# Patient Record
Sex: MALE | Race: BLACK OR AFRICAN AMERICAN | NOT HISPANIC OR LATINO | Employment: FULL TIME | URBAN - METROPOLITAN AREA
[De-identification: names, ages, dates, MRNs, and addresses within clinical notes are randomized per-mention and may not be internally consistent; named-entity substitution may affect disease eponyms.]

---

## 2017-03-29 ENCOUNTER — OFFICE VISIT (OUTPATIENT)
Dept: FAMILY MEDICINE | Facility: CLINIC | Age: 52
End: 2017-03-29
Payer: COMMERCIAL

## 2017-03-29 VITALS
DIASTOLIC BLOOD PRESSURE: 70 MMHG | HEART RATE: 81 BPM | BODY MASS INDEX: 31.06 KG/M2 | WEIGHT: 242 LBS | OXYGEN SATURATION: 99 % | RESPIRATION RATE: 14 BRPM | SYSTOLIC BLOOD PRESSURE: 134 MMHG | TEMPERATURE: 98 F | HEIGHT: 74 IN

## 2017-03-29 DIAGNOSIS — R07.0 THROAT PAIN: ICD-10-CM

## 2017-03-29 DIAGNOSIS — R11.0 NAUSEA: ICD-10-CM

## 2017-03-29 DIAGNOSIS — R50.9 FEVER, UNSPECIFIED FEVER CAUSE: ICD-10-CM

## 2017-03-29 DIAGNOSIS — B34.9 VIRAL SYNDROME: Primary | ICD-10-CM

## 2017-03-29 LAB
BASOPHILS # BLD AUTO: 0 10E9/L (ref 0–0.2)
BASOPHILS NFR BLD AUTO: 0.3 %
DEPRECATED S PYO AG THROAT QL EIA: NORMAL
DIFFERENTIAL METHOD BLD: NORMAL
EOSINOPHIL # BLD AUTO: 0 10E9/L (ref 0–0.7)
EOSINOPHIL NFR BLD AUTO: 0.3 %
ERYTHROCYTE [DISTWIDTH] IN BLOOD BY AUTOMATED COUNT: 12.8 % (ref 10–15)
FLUAV+FLUBV AG SPEC QL: NEGATIVE
FLUAV+FLUBV AG SPEC QL: NORMAL
HCT VFR BLD AUTO: 43 % (ref 40–53)
HGB BLD-MCNC: 13.8 G/DL (ref 13.3–17.7)
LYMPHOCYTES # BLD AUTO: 2.4 10E9/L (ref 0.8–5.3)
LYMPHOCYTES NFR BLD AUTO: 37.4 %
MCH RBC QN AUTO: 28 PG (ref 26.5–33)
MCHC RBC AUTO-ENTMCNC: 32.1 G/DL (ref 31.5–36.5)
MCV RBC AUTO: 87 FL (ref 78–100)
MICRO REPORT STATUS: NORMAL
MONOCYTES # BLD AUTO: 0.7 10E9/L (ref 0–1.3)
MONOCYTES NFR BLD AUTO: 10.3 %
NEUTROPHILS # BLD AUTO: 3.3 10E9/L (ref 1.6–8.3)
NEUTROPHILS NFR BLD AUTO: 51.7 %
PLATELET # BLD AUTO: 195 10E9/L (ref 150–450)
RBC # BLD AUTO: 4.93 10E12/L (ref 4.4–5.9)
SPECIMEN SOURCE: NORMAL
SPECIMEN SOURCE: NORMAL
WBC # BLD AUTO: 6.4 10E9/L (ref 4–11)

## 2017-03-29 PROCEDURE — 99214 OFFICE O/P EST MOD 30 MIN: CPT | Performed by: PHYSICIAN ASSISTANT

## 2017-03-29 PROCEDURE — 87804 INFLUENZA ASSAY W/OPTIC: CPT | Performed by: PHYSICIAN ASSISTANT

## 2017-03-29 PROCEDURE — 85025 COMPLETE CBC W/AUTO DIFF WBC: CPT | Performed by: PHYSICIAN ASSISTANT

## 2017-03-29 PROCEDURE — 36415 COLL VENOUS BLD VENIPUNCTURE: CPT | Performed by: PHYSICIAN ASSISTANT

## 2017-03-29 PROCEDURE — 87880 STREP A ASSAY W/OPTIC: CPT | Performed by: PHYSICIAN ASSISTANT

## 2017-03-29 PROCEDURE — 87081 CULTURE SCREEN ONLY: CPT | Performed by: PHYSICIAN ASSISTANT

## 2017-03-29 RX ORDER — ONDANSETRON 8 MG/1
8 TABLET, FILM COATED ORAL EVERY 8 HOURS PRN
Qty: 9 TABLET | Refills: 0 | Status: SHIPPED | OUTPATIENT
Start: 2017-03-29 | End: 2024-05-08

## 2017-03-29 NOTE — LETTER
[facility]  [address]  [facility] [id number] [phone]                                                                                                           Dimitry Rick Sr.  [address] [place] [address]    March 31, 2017      Dear Dimitry,    Your recent lab results are below. Your throat culture did not grow strep bacteria.   Return to clinic for recheck if your symptoms are not improving as anticipated.     Results for orders placed or performed in visit on 03/29/17   CBC with platelets and differential   Result Value Ref Range    WBC 6.4 4.0 - 11.0 10e9/L    RBC Count 4.93 4.4 - 5.9 10e12/L    Hemoglobin 13.8 13.3 - 17.7 g/dL    Hematocrit 43.0 40.0 - 53.0 %    MCV 87 78 - 100 fl    MCH 28.0 26.5 - 33.0 pg    MCHC 32.1 31.5 - 36.5 g/dL    RDW 12.8 10.0 - 15.0 %    Platelet Count 195 150 - 450 10e9/L    Diff Method Automated Method     % Neutrophils 51.7 %    % Lymphocytes 37.4 %    % Monocytes 10.3 %    % Eosinophils 0.3 %    % Basophils 0.3 %    Absolute Neutrophil 3.3 1.6 - 8.3 10e9/L    Absolute Lymphocytes 2.4 0.8 - 5.3 10e9/L    Absolute Monocytes 0.7 0.0 - 1.3 10e9/L    Absolute Eosinophils 0.0 0.0 - 0.7 10e9/L    Absolute Basophils 0.0 0.0 - 0.2 10e9/L   Strep, Rapid Screen   Result Value Ref Range    Specimen Description Throat     Rapid Strep A Screen       NEGATIVE: No Group A streptococcal antigen detected by immunoassay, await   culture report.      Micro Report Status FINAL 03/29/2017    Influenza A/B antigen   Result Value Ref Range    Influenza A/B Agn Specimen Nasal     Influenza A Negative NEG    Influenza B  NEG     Negative   Test results must be correlated with clinical data. If necessary, results   should be confirmed by a molecular assay or viral culture.     Beta strep group A culture   Result Value Ref Range    Specimen Description Throat     Culture Micro No Beta Streptococcus isolated     Micro  Report Status FINAL 03/31/2017      Thank you for choosing [facility].  We appreciate the opportunity to serve you and look forward to supporting your healthcare needs in the future.    If you have any questions or concerns, please call me or my staff at [phone].      Sincerely,        Nicole Joy Siegler, PA-C

## 2017-03-29 NOTE — MR AVS SNAPSHOT
"              After Visit Summary   3/29/2017    Dimitry Rick Sr.    MRN: [id number]           Patient Information     Date Of Birth          1965        Visit Information        Provider Department      3/29/2017 4:10 PM Siegler, Nicole Joy, PA-C [facility]        Today's Diagnoses     Viral syndrome    -  1    Throat pain        Fever, unspecified fever cause        Nausea           Follow-ups after your visit        Who to contact     If you have questions or need follow up information about today's clinic visit or your schedule please contact [facility] directly at [phone].  Normal or non-critical lab and imaging results will be communicated to you by [employer]Pandahart, letter or phone within 4 business days after the clinic has received the results. If you do not hear from us within 7 days, please contact the clinic through [employer]and[employer] or phone. If you have a critical or abnormal lab result, we will notify you by phone as soon as possible.  Submit refill requests through [employer] or call your pharmacy and they will forward the refill request to us. Please allow 3 business days for your refill to be completed.          Additional Information About Your Visit        MyChart Information     FaceBuzz lets you send messages to your doctor, view your test results, renew your prescriptions, schedule appointments and more. To sign up, go to [email or web address] . Click on \"Log in\" on the left side of the screen, which will take you to the Welcome page. Then click on \"Sign up Now\" on the right side of the page.     You will be asked to enter the access code listed below, as well as some personal information. Please follow the directions to create your username and password.     Your access code is: [email or web address]  Expires: 2017  5:07 PM     Your access code will  in 90 days. If you need help or a new code, please call your [facility] or " "[phone].        Care EveryWhere ID     This is your Care EveryWhere ID. This could be used by other organizations to access your [place] medical records  [id number]        Your Vitals Were     Pulse Temperature Respirations Height Pulse Oximetry BMI (Body Mass Index)    81 98  F (36.7  C) (Tympanic) 14 6' 2.25\" (1.886 m) 99% 30.86 kg/m2       Blood Pressure from Last 3 Encounters:   03/29/17 134/70   10/10/16 120/82   08/31/15 118/74    Weight from Last 3 Encounters:   03/29/17 242 lb (109.8 kg)   10/10/16 246 lb (111.6 kg)   08/31/15 248 lb (112.5 kg)              We Performed the Following     CBC with platelets and differential     Influenza A/B antigen     Strep, Rapid Screen          Today's Medication Changes          These changes are accurate as of: 3/29/17  5:07 PM.  If you have any questions, ask your nurse or doctor.               Start taking these medicines.        Dose/Directions    ondansetron 8 MG tablet   Commonly known as:  ZOFRAN   Used for:  Nausea   Started by:  Siegler, Nicole Joy, PA-C        Dose:  8 mg   Take 1 tablet (8 mg) by mouth every 8 hours as needed for nausea   Quantity:  9 tablet   Refills:  0            Where to get your medicines      These medications were sent to [facility]/pharmacy #[id number] - [place], [place] - [address] [address]     Phone:  [phone]     ondansetron 8 MG tablet                Primary Care Provider Office Phone # Fax #    Jacob Agustín Patel [phone]       [facility] [address] [address]        Thank you!     Thank you for choosing [facility]  for your care. Our goal is always to provide you with excellent care. Hearing back from our patients is one way we can continue to improve our services. Please take a few minutes to complete the written survey that you may receive in the mail after your visit with us. Thank you!           "   Your Updated Medication List - Protect others around you: Learn how to safely use, store and throw away your medicines at www.disposemymeds.org.          This list is accurate as of: 3/29/17  5:07 PM.  Always use your most recent med list.                   Brand Name Dispense Instructions for use    * aspirin 81 MG tablet     100 tablet    Take 1 tablet (81 mg) by mouth daily       * aspirin 81 MG chewable tablet    ASPIRIN LOW DOSE    90 tablet    TAKE 1 TABLET (81 MG) BY MOUTH DAILY       blood glucose monitoring meter device kit     1 kit    Use to test blood sugars twice daily as directed.       FIBERCON 625 MG tablet   Generic drug:  calcium polycarbophil     30    1 tab q d       glipiZIDE 5 MG 24 hr tablet    GLUCOTROL XL    90 tablet    TAKE 1 TABLET (5 MG) BY MOUTH DAILY       lisinopril 40 MG tablet    PRINIVIL/ZESTRIL    90 tablet    TAKE 1 TABLET (40 MG) BY MOUTH DAILY       metFORMIN 500 MG 24 hr tablet    GLUCOPHAGE-XR    360 tablet    TAKE 4 TABLETS (2,000 MG) BY MOUTH DAILY (WITH BREAKFAST)       ondansetron 8 MG tablet    ZOFRAN    9 tablet    Take 1 tablet (8 mg) by mouth every 8 hours as needed for nausea       ONE TOUCH ULTRA test strip   Generic drug:  blood glucose monitoring     200 strip    USE TO TEST BLOOD SUGARS TWICE DAILY AS DIRECTED.       ONETOUCH DELICA LANCETS 33G Misc     200 each    USE 1 LANCET 2 TIMES DAILY TO TEST BLOOD SUGARS TWICE DAILY AS DIRECTED.       simvastatin 80 MG tablet    ZOCOR    90 tablet    TAKE 1 TABLET (80 MG) BY MOUTH AT BEDTIME       terbinafine 250 MG tablet    lamISIL    90 tablet    Take 1 tablet (250 mg) by mouth daily       * Notice:  This list has 2 medication(s) that are the same as other medications prescribed for you. Read the directions carefully, and ask your doctor or other care provider to review them with you.

## 2017-03-29 NOTE — PROGRESS NOTES
SUBJECTIVE:                                                    Dimitry Rick Sr. is a 51 year old male who presents to clinic today for the following health issues:    RESPIRATORY SYMPTOMS      Duration: X7 days    Description  sore throat, cough, fever, chills, fatigue/malaise and myalgias, loose stools    Severity: moderate    Accompanying signs and symptoms: None    History (predisposing factors):  none    Precipitating or alleviating factors: None    Therapies tried and outcome:  rest and fluids acetaminophen nyquil/alkaseltzer      As above; symptoms that began as cough, fever, sore throat with painful swallowing that are now also including loose stools and nausea without vomiting. Fever has been to 102 at home though most recent was 100.5 today, seems to be improving. He has used fever reducing medications earlier this AM. He has had minimal improvement with rest, fluids and OTC cold medications.     No known ill contacts, he has not had any recent travel. No hx of asthma or pulmonary disease. He is not a diabetic or immune compromised.    Problem list and histories reviewed & adjusted, as indicated.  Additional history: as documented    Patient Active Problem List   Diagnosis     Personal history of allergy to penicillin     Disturbance of skin sensation     Dermatophytosis of nail     Nonspecific abnormal results of liver function study     Internal hemorrhoids     ETD (eustachian tube dysfunction)     Diabetes mellitus type 2, controlled, without complications (H)     Hyperlipidemia LDL goal <100     Hypertension goal BP (blood pressure) < 140/90     CTS (carpal tunnel syndrome)     History reviewed. No pertinent surgical history.    Social History   Substance Use Topics     Smoking status: Former Smoker     Quit date: 11/10/2001     Smokeless tobacco: Never Used     Alcohol use 0.0 oz/week     0 Standard drinks or equivalent per week      Comment: 1-2 drinks/day     Family History   Problem Relation  Age of Onset     DIABETES Mother      Type 1     Arthritis Mother      ?     CEREBROVASCULAR DISEASE Father      C.A.D. Sister      triple bypass     Genetic Disorder Sister      psoriasis         Current Outpatient Prescriptions   Medication Sig Dispense Refill     ondansetron (ZOFRAN) 8 MG tablet Take 1 tablet (8 mg) by mouth every 8 hours as needed for nausea 9 tablet 0     ONE TOUCH ULTRA test strip USE TO TEST BLOOD SUGARS TWICE DAILY AS DIRECTED. 200 strip 0     ONETOUCH DELICA LANCETS 33G MISC USE 1 LANCET 2 TIMES DAILY TO TEST BLOOD SUGARS TWICE DAILY AS DIRECTED. 200 each 0     metFORMIN (GLUCOPHAGE-XR) 500 MG 24 hr tablet TAKE 4 TABLETS (2,000 MG) BY MOUTH DAILY (WITH BREAKFAST) 360 tablet 3     glipiZIDE (GLUCOTROL XL) 5 MG 24 hr tablet TAKE 1 TABLET (5 MG) BY MOUTH DAILY 90 tablet 3     lisinopril (PRINIVIL,ZESTRIL) 40 MG tablet TAKE 1 TABLET (40 MG) BY MOUTH DAILY 90 tablet 3     simvastatin (ZOCOR) 80 MG tablet TAKE 1 TABLET (80 MG) BY MOUTH AT BEDTIME 90 tablet 3     aspirin (ASPIRIN LOW DOSE) 81 MG chewable tablet TAKE 1 TABLET (81 MG) BY MOUTH DAILY 90 tablet 3     aspirin 81 MG tablet Take 1 tablet (81 mg) by mouth daily 100 tablet 11     blood glucose monitoring (ONE TOUCH ULTRA MINI) meter device kit Use to test blood sugars twice daily as directed. 1 kit 0     FIBERCON 625 MG OR TABS 1 tab q d 30 0     terbinafine (LAMISIL) 250 MG tablet Take 1 tablet (250 mg) by mouth daily (Patient not taking: Reported on 3/29/2017) 90 tablet 0       Reviewed and updated as needed this visit by clinical staff  Tobacco  Allergies  Meds  Med Hx  Surg Hx  Fam Hx  Soc Hx      Reviewed and updated as needed this visit by Provider         ROS:  CONSTITUTIONAL:POSITIVE  for anorexia, fatigue, fever, malaise and myalgias and NEGATIVE  for weight loss  INTEGUMENTARY/SKIN: NEGATIVE for worrisome rashes or lesion  EYES: NEGATIVE for redness, discharge or irritation  ENT/MOUTH: POSITIVE for sore throat as above and  "NEGATIVE for earache, hoarse voice, nasal congestion, postnasal drainage and sinus pressure  RESP:POSITIVE for cough-non productive and intermittent shortness of breath and NEGATIVE for dyspnea on exertion, sputum and wheezing  CV: NEGATIVE for chest pain, palpitations or peripheral edema  GI: POSITIVE for nausea and abdominal pain associated with loose stools (improves following stool) and NEGATIVE for constipation, gas or bloating, heartburn or reflux, hematochezia, melena and vomiting    OBJECTIVE:                                                    /70 (BP Location: Right arm, Patient Position: Chair, Cuff Size: Adult Regular)  Pulse 81  Temp 98  F (36.7  C) (Tympanic)  Resp 14  Ht 6' 2.25\" (1.886 m)  Wt 242 lb (109.8 kg)  SpO2 99%  BMI 30.86 kg/m2  Body mass index is 30.86 kg/(m^2).  GENERAL: healthy, alert and no distress  EYES: Eyes grossly normal to inspection, PERRL and conjunctivae and sclerae normal  HENT: normal cephalic/atraumatic, ear canals and TM's normal, nasal mucosa edematous , oropharynx clear, oral mucous membranes moist, tonsillar hypertrophy and tonsillar erythema  NECK: bilateral anterior cervical adenopathy, no asymmetry, masses, or scars and thyroid normal to palpation  RESP: lungs clear to auscultation - no rales, rhonchi or wheezes  CV: regular rate and rhythm, normal S1 S2, no S3 or S4, no murmur, click or rub, no peripheral edema and peripheral pulses strong  ABDOMEN: soft, nontender, no hepatosplenomegaly, no masses and bowel sounds normal  MS: no gross musculoskeletal defects noted, no edema  SKIN: no suspicious lesions or rashes  PSYCH: mentation appears normal, affect normal/bright    Diagnostic Test Results:  Results for orders placed or performed in visit on 03/29/17 (from the past 24 hour(s))   Strep, Rapid Screen   Result Value Ref Range    Specimen Description Throat     Rapid Strep A Screen       NEGATIVE: No Group A streptococcal antigen detected by immunoassay, " await   culture report.      Micro Report Status FINAL 03/29/2017    Influenza A/B antigen   Result Value Ref Range    Influenza A/B Agn Specimen Nasal     Influenza A Negative NEG    Influenza B  NEG     Negative   Test results must be correlated with clinical data. If necessary, results   should be confirmed by a molecular assay or viral culture.     CBC with platelets and differential   Result Value Ref Range    WBC 6.4 4.0 - 11.0 10e9/L    RBC Count 4.93 4.4 - 5.9 10e12/L    Hemoglobin 13.8 13.3 - 17.7 g/dL    Hematocrit 43.0 40.0 - 53.0 %    MCV 87 78 - 100 fl    MCH 28.0 26.5 - 33.0 pg    MCHC 32.1 31.5 - 36.5 g/dL    RDW 12.8 10.0 - 15.0 %    Platelet Count 195 150 - 450 10e9/L    Diff Method Automated Method     % Neutrophils 51.7 %    % Lymphocytes 37.4 %    % Monocytes 10.3 %    % Eosinophils 0.3 %    % Basophils 0.3 %    Absolute Neutrophil 3.3 1.6 - 8.3 10e9/L    Absolute Lymphocytes 2.4 0.8 - 5.3 10e9/L    Absolute Monocytes 0.7 0.0 - 1.3 10e9/L    Absolute Eosinophils 0.0 0.0 - 0.7 10e9/L    Absolute Basophils 0.0 0.0 - 0.2 10e9/L        ASSESSMENT/PLAN:                                                        ICD-10-CM    1. Viral syndrome B34.9    2. Throat pain R07.0 Strep, Rapid Screen   3. Fever, unspecified fever cause R50.9 Influenza A/B antigen     CBC with platelets and differential   4. Nausea R11.0 ondansetron (ZOFRAN) 8 MG tablet     Lab results reviewed with the patient. Negative strep and flu, blood cell counts are normal and not suggestive of inflammation or bacterial infection. He does have some clinic findings suggestive of viral syndrome and he agrees to continue to monitor and treat with conservative measures. I provided him prescription for antinausea medication and should that not be helpful or his symptoms persist he will call or return for re-evaluation. He agrees with this plan.       Nicole Joy Siegler, PA-C  [facility]

## 2017-03-29 NOTE — NURSING NOTE
"Chief Complaint   Patient presents with     URI     Cough, loose stools, sore throat, had elevated temp, body aches, vomiting       Initial /70 (BP Location: Right arm, Patient Position: Chair, Cuff Size: Adult Regular)  Pulse 81  Temp 98  F (36.7  C) (Tympanic)  Resp 14  Ht 6' 2.25\" (1.886 m)  Wt 242 lb (109.8 kg)  SpO2 99%  BMI 30.86 kg/m2 Estimated body mass index is 30.86 kg/(m^2) as calculated from the following:    Height as of this encounter: 6' 2.25\" (1.886 m).    Weight as of this encounter: 242 lb (109.8 kg).  Medication Reconciliation: complete     Luisa Bradshaw LPN  "

## 2017-03-31 LAB
BACTERIA SPEC CULT: NORMAL
MICRO REPORT STATUS: NORMAL
SPECIMEN SOURCE: NORMAL

## 2017-06-26 ENCOUNTER — TRANSFERRED RECORDS (OUTPATIENT)
Dept: HEALTH INFORMATION MANAGEMENT | Facility: CLINIC | Age: 52
End: 2017-06-26

## 2017-08-07 ENCOUNTER — TELEPHONE (OUTPATIENT)
Dept: FAMILY MEDICINE | Facility: CLINIC | Age: 52
End: 2017-08-07

## 2017-08-07 NOTE — TELEPHONE ENCOUNTER
Myra,  Per [facility] patient needs PA for Simvastatin.  Insurance only will pay for 40 mg daily. Current dose is 80 mg daily.  [facility] gave me number to call  [phone].    I called patient to inform him of this.  Thanks, Connie Meehan RN

## 2017-08-07 NOTE — TELEPHONE ENCOUNTER
Reason for Call:  Medication or medication refill:    Do you use a [facility]?  Name of the pharmacy and phone number for the current request:    [facility]/PHARMACY #[id number] - [address]    Name of the medication requested: simvastatin (ZOCOR) 80 MG tablet    Other request: pt. Is aware that he can no longer be seen at a  clinic.  He will look for a new clinic, but wondering if he can  Get at least a partial refill of the medication?  Please call to advise    Can we leave a detailed message on this number? YES    Phone number patient can be reached at: Home number on file [phone] (home) or Cell number on file:    Telephone Information:   Mobile [phone]       Best Time: any time    Call taken on 8/7/2017 at 4:48 PM by Rosemary Foster    .

## 2017-08-15 NOTE — TELEPHONE ENCOUNTER
Patient calling to speak with Myra, said it was regarding  The Simvistatin, no further information  Please call back at [phone]

## 2017-11-20 DIAGNOSIS — I10 ESSENTIAL HYPERTENSION WITH GOAL BLOOD PRESSURE LESS THAN 140/90: ICD-10-CM

## 2017-11-20 DIAGNOSIS — E78.5 HYPERLIPIDEMIA LDL GOAL <100: ICD-10-CM

## 2017-11-20 DIAGNOSIS — E11.9 CONTROLLED TYPE 2 DIABETES MELLITUS WITHOUT COMPLICATION, WITHOUT LONG-TERM CURRENT USE OF INSULIN (H): ICD-10-CM

## 2017-11-20 RX ORDER — METFORMIN HCL 500 MG
TABLET, EXTENDED RELEASE 24 HR ORAL
Qty: 120 TABLET | Refills: 0 | Status: SHIPPED | OUTPATIENT
Start: 2017-11-20

## 2017-11-20 RX ORDER — LISINOPRIL 40 MG/1
TABLET ORAL
Qty: 30 TABLET | Refills: 0 | Status: SHIPPED | OUTPATIENT
Start: 2017-11-20

## 2017-11-20 RX ORDER — ASPIRIN 81 MG
TABLET,CHEWABLE ORAL
Qty: 30 TABLET | Refills: 0 | Status: SHIPPED | OUTPATIENT
Start: 2017-11-20

## 2017-11-20 RX ORDER — GLIPIZIDE 5 MG/1
5 TABLET, FILM COATED, EXTENDED RELEASE ORAL DAILY
Qty: 30 TABLET | Refills: 0 | Status: SHIPPED | OUTPATIENT
Start: 2017-11-20

## 2017-11-20 RX ORDER — SIMVASTATIN 80 MG
TABLET ORAL
Qty: 30 TABLET | Refills: 0 | Status: SHIPPED | OUTPATIENT
Start: 2017-11-20

## 2017-11-20 NOTE — TELEPHONE ENCOUNTER
Medication is being filled for 1 time refill only due to:  Patient needs to be seen because it has been more than one year since last visit.   Due for physical, labs.  Last 10/10/16.  Connie Meehan RN    Simvastatin, Metformin, Glipizide, ASA 81 mg, Lisinopril         Last Written Prescription Date: 10/10/2016  Last Fill Quantity: 3 month supply , # refills: 3  Last Office Visit with [facility], [facility] or [facility] prescribing provider:  10/10/2016        BP Readings from Last 3 Encounters:   03/29/17 134/70   10/10/16 120/82   08/31/15 118/74     Lab Results   Component Value Date    MICROL 46 10/10/2016     Lab Results   Component Value Date    UMALCR 28.40 10/10/2016     Creatinine   Date Value Ref Range Status   10/10/2016 0.87 0.66 - 1.25 mg/dL Final   ]  GFR Estimate   Date Value Ref Range Status   10/10/2016 >90  Non  GFR Calc   >60 mL/min/1.7m2 Final   08/31/2015 85 >60 mL/min/1.7m2 Final     Comment:     Non  GFR Calc   09/08/2014 80 >60 mL/min/1.7m2 Final     Comment:     Non  GFR Calc     GFR Estimate If Black   Date Value Ref Range Status   10/10/2016 >90   GFR Calc   >60 mL/min/1.7m2 Final   08/31/2015 >90   GFR Calc   >60 mL/min/1.7m2 Final   09/08/2014 >90   GFR Calc   >60 mL/min/1.7m2 Final     Lab Results   Component Value Date    CHOL 146 10/10/2016     Lab Results   Component Value Date    HDL 48 10/10/2016     Lab Results   Component Value Date    LDL 84 10/10/2016     Lab Results   Component Value Date    TRIG 68 10/10/2016     Lab Results   Component Value Date    CHOLHDLRATIO 2.5 09/08/2014     Lab Results   Component Value Date    AST 65 10/10/2016     Lab Results   Component Value Date    ALT 36 10/10/2016     Lab Results   Component Value Date    A1C 6.3 10/10/2016    A1C 6.3 08/31/2015    A1C 6.9 09/08/2014    A1C 6.4 12/30/2013    A1C 6.7 09/09/2013     Potassium   Date Value Ref Range Status   10/10/2016  4.0 3.4 - 5.3 mmol/L Final

## 2024-04-12 ENCOUNTER — TRANSFERRED RECORDS (OUTPATIENT)
Dept: MULTI SPECIALTY CLINIC | Facility: CLINIC | Age: 59
End: 2024-04-12

## 2024-04-12 LAB — RETINOPATHY: NORMAL

## 2024-05-08 ENCOUNTER — HOSPITAL ENCOUNTER (EMERGENCY)
Facility: CLINIC | Age: 59
Discharge: HOME | End: 2024-05-09
Attending: EMERGENCY MEDICINE | Admitting: EMERGENCY MEDICINE
Payer: COMMERCIAL

## 2024-05-08 DIAGNOSIS — N30.01 ACUTE CYSTITIS WITH HEMATURIA: ICD-10-CM

## 2024-05-08 LAB
ALBUMIN UR-MCNC: 300 MG/DL
APPEARANCE UR: ABNORMAL
BILIRUB UR QL STRIP: NEGATIVE
COLOR UR AUTO: ABNORMAL
GLUCOSE UR STRIP-MCNC: NEGATIVE MG/DL
HGB UR QL STRIP: ABNORMAL
KETONES UR STRIP-MCNC: ABNORMAL MG/DL
LEUKOCYTE ESTERASE UR QL STRIP: ABNORMAL
MUCOUS THREADS #/AREA URNS LPF: PRESENT /LPF
NITRATE UR QL: NEGATIVE
PH UR STRIP: 6 [PH] (ref 5–7)
RBC URINE: >182 /HPF
SP GR UR STRIP: 1.02 (ref 1–1.03)
SQUAMOUS EPITHELIAL: 3 /HPF
UROBILINOGEN UR STRIP-MCNC: NORMAL MG/DL
WBC CLUMPS #/AREA URNS HPF: PRESENT /HPF
WBC URINE: >182 /HPF

## 2024-05-08 PROCEDURE — 81001 URINALYSIS AUTO W/SCOPE: CPT | Performed by: EMERGENCY MEDICINE

## 2024-05-08 PROCEDURE — 87086 URINE CULTURE/COLONY COUNT: CPT | Performed by: EMERGENCY MEDICINE

## 2024-05-08 PROCEDURE — 250N000013 HC RX MED GY IP 250 OP 250 PS 637: Performed by: EMERGENCY MEDICINE

## 2024-05-08 PROCEDURE — 99284 EMERGENCY DEPT VISIT MOD MDM: CPT

## 2024-05-08 PROCEDURE — 87186 SC STD MICRODIL/AGAR DIL: CPT | Performed by: EMERGENCY MEDICINE

## 2024-05-08 RX ORDER — ACETAMINOPHEN 500 MG
1000 TABLET ORAL ONCE
Status: COMPLETED | OUTPATIENT
Start: 2024-05-08 | End: 2024-05-08

## 2024-05-08 RX ADMIN — ACETAMINOPHEN 1000 MG: 500 TABLET, FILM COATED ORAL at 22:52

## 2024-05-08 ASSESSMENT — COLUMBIA-SUICIDE SEVERITY RATING SCALE - C-SSRS
1. IN THE PAST MONTH, HAVE YOU WISHED YOU WERE DEAD OR WISHED YOU COULD GO TO SLEEP AND NOT WAKE UP?: NO
6. HAVE YOU EVER DONE ANYTHING, STARTED TO DO ANYTHING, OR PREPARED TO DO ANYTHING TO END YOUR LIFE?: NO
2. HAVE YOU ACTUALLY HAD ANY THOUGHTS OF KILLING YOURSELF IN THE PAST MONTH?: NO

## 2024-05-09 ENCOUNTER — TELEPHONE (OUTPATIENT)
Dept: NURSING | Facility: CLINIC | Age: 59
End: 2024-05-09
Payer: COMMERCIAL

## 2024-05-09 VITALS
DIASTOLIC BLOOD PRESSURE: 76 MMHG | SYSTOLIC BLOOD PRESSURE: 138 MMHG | HEIGHT: 74 IN | BODY MASS INDEX: 30.61 KG/M2 | WEIGHT: 238.54 LBS | TEMPERATURE: 99.4 F | HEART RATE: 98 BPM | OXYGEN SATURATION: 98 % | RESPIRATION RATE: 16 BRPM

## 2024-05-09 PROCEDURE — 250N000013 HC RX MED GY IP 250 OP 250 PS 637: Performed by: EMERGENCY MEDICINE

## 2024-05-09 RX ORDER — CEFDINIR 300 MG/1
300 CAPSULE ORAL ONCE
Status: COMPLETED | OUTPATIENT
Start: 2024-05-09 | End: 2024-05-09

## 2024-05-09 RX ORDER — CEFPODOXIME PROXETIL 200 MG/1
200 TABLET, FILM COATED ORAL 2 TIMES DAILY
Qty: 20 TABLET | Refills: 0 | Status: SHIPPED | OUTPATIENT
Start: 2024-05-09 | End: 2024-05-19

## 2024-05-09 RX ORDER — PHENAZOPYRIDINE HYDROCHLORIDE 200 MG/1
200 TABLET, FILM COATED ORAL 3 TIMES DAILY
Qty: 9 TABLET | Refills: 0 | Status: SHIPPED | OUTPATIENT
Start: 2024-05-09 | End: 2024-05-12

## 2024-05-09 RX ORDER — PHENAZOPYRIDINE HYDROCHLORIDE 100 MG/1
200 TABLET, FILM COATED ORAL ONCE
Status: COMPLETED | OUTPATIENT
Start: 2024-05-09 | End: 2024-05-09

## 2024-05-09 RX ADMIN — CEFDINIR 300 MG: 300 CAPSULE ORAL at 00:35

## 2024-05-09 RX ADMIN — PHENAZOPYRIDINE 200 MG: 100 TABLET ORAL at 00:35

## 2024-05-09 NOTE — TELEPHONE ENCOUNTER
Patient calling requesting to reach Dr Ulrich's office to leave a message.  Misdirected call.    Per [facility] Dr Ulrich is through [facility]. Reviewed correct phone number with patient and transferred.    Patient stating he has questions on medication prescribed by Dr Ulrich and is requesting a message.    Roula Marks RN  [facility]

## 2024-05-09 NOTE — ED TRIAGE NOTES
"Hematuria started today but \"it looked a little pink the other day\". Pt now feels like he is unable to empty his bladder.         "

## 2024-05-09 NOTE — ED PROVIDER NOTES
"    History     Chief Complaint:  Hematuria       HPI   Dimitry Rick Sr. is a 59 year old male with a history of CAD, HYUN, NSTEMI, hypertension, hyperlipidemia, rheumatoid arthritis, and type 2 diabetes who presents to the ED for evaluation of hematuria. The patient states the hematuria started today along with dysuria, lower abdominal pain, and increased frequency and urgency with urination. States when he does urinate, it does not feel like his bladder completely empties. States he has never had these symptoms before. Denies changes in bowel movements, fever, vomiting, and penile discharge. Denies history of urinary tract infections, prostates issues, kidney stones, and recent STIs.     Independent Historian:    None    Medications:    Aspirin 81 mg  Fibercon  Glipizide  Lisinopril  Metformin  Simvastatin   Farxiga  Jardiance  Evolocumab  Metoprolol tartrate     Past Medical History:    HTN  HLD  RA  Diabetes, type 2  ANA MARIA  NSTEMI  CAD  HYUN  Hepatic steatosis  Carpal tunnel    Physical Exam   Patient Vitals for the past 24 hrs:   BP Temp Temp src Pulse Resp SpO2 Height Weight   05/09/24 0039 138/76 -- -- 98 16 98 % -- --   05/08/24 2250 (!) 140/89 99.4  F (37.4  C) Temporal 106 16 98 % 1.88 m (6' 2\") 108.2 kg (238 lb 8.6 oz)        Physical Exam  General: Patient is awake, alert and interactive when I enter the room.  Head: The scalp, face, and head appear normal  Eyes: Conjunctivae and sclerae are normal  Neck: Normal range of motion.   CV: Regular rate.   Resp:  No respiratory distress.   GI: suprapubic tenderness but abdomen is soft, no rigidity. No evidence of pulsatile mass. No fluid waves or evidence of ascites. No distension. No hernias or bruising are noted in detailed exam. No CVA tenderness.    MS: Normal tone.   Skin: Normal capillary refill noted  Neuro: Speech is normal and fluent. Face is symmetric. Moving all extremities.   Psych:  Normal affect.  Appropriate interactions.         Emergency " Department Course     Laboratory:  Labs Ordered and Resulted from Time of ED Arrival to Time of ED Departure   ROUTINE UA WITH MICROSCOPIC REFLEX TO CULTURE - Abnormal       Result Value    Color Urine Orange (*)     Appearance Urine Cloudy (*)     Glucose Urine Negative      Bilirubin Urine Negative      Ketones Urine Trace (*)     Specific Gravity Urine 1.021      Blood Urine Large (*)     pH Urine 6.0      Protein Albumin Urine 300 (*)     Urobilinogen Urine Normal      Nitrite Urine Negative      Leukocyte Esterase Urine Large (*)     WBC Clumps Urine Present (*)     Mucus Urine Present (*)     RBC Urine >182 (*)     WBC Urine >182 (*)     Squamous Epithelials Urine 3 (*)    URINE CULTURE      Emergency Department Course & Assessments:    Interventions:  Medications   acetaminophen (TYLENOL) tablet 1,000 mg (1,000 mg Oral $Given 5/8/24 2252)   cefdinir (OMNICEF) capsule 300 mg (300 mg Oral $Given 5/9/24 0035)   phenazopyridine (PYRIDIUM) tablet 200 mg (200 mg Oral $Given 5/9/24 0035)        Assessments:  0010 I obtained history and performed a physical exam as noted above.     Independent Interpretation (X-rays, CTs, rhythm strip):  None    Consultations/Discussion of Management or Tests:  None       Social Determinants of Health affecting care:  None     Disposition:  The patient was discharged.    Impression & Plan         Medical Decision Making:  This a 59-year-old gentleman who presents to the emergency department with hematuria, dysuria, suprapubic pain, increased urgency and frequency of urination.  Overall the patient's constellation of symptoms and his urinalysis is consistent with a urinary tract infection.  He has no significant flank pain, fevers, vomiting or other systemic symptoms to raise my suspicion for pyelonephritis or more systemic infectious at this time.  Will cover with cefepime as this is deemed a complicated UTI given that this is a male.  Low suspicion for prostatitis or STI.  Will him  follow-up with urology as needed and return the emergency room with any new or worsening symptoms.    Diagnosis:    ICD-10-CM    1. Acute cystitis with hematuria  N30.01            Discharge Medications:  Discharge Medication List as of 5/9/2024 12:33 AM        START taking these medications    Details   cefpodoxime (VANTIN) 200 MG tablet Take 1 tablet (200 mg) by mouth 2 times daily for 10 days, Disp-20 tablet, R-0, Local Print      phenazopyridine (PYRIDIUM) 200 MG tablet Take 1 tablet (200 mg) by mouth 3 times daily for 3 days, Disp-9 tablet, R-0, Local Print              Scribe Disclosure:  SAMUEL, Génesis Yun, am serving as a scribe at 12:17 AM on 5/9/2024 to document services personally performed by Herman Sarmiento MD based on my observations and the provider's statements to me.    5/8/2024   Herman Sarmiento MD Battista, Christopher Joseph, MD  05/09/24 0057

## 2024-05-10 ENCOUNTER — TELEPHONE (OUTPATIENT)
Dept: EMERGENCY MEDICINE | Facility: CLINIC | Age: 59
End: 2024-05-10
Payer: COMMERCIAL

## 2024-05-10 LAB — BACTERIA UR CULT: ABNORMAL

## 2024-05-10 NOTE — TELEPHONE ENCOUNTER
"[facility]    Reason for call: Lab Result Notification     Lab Result (including Rx patient on, if applicable).  If culture, copy of lab report at bottom.  Lab Result: See below  Cefpodoxime (Vantin) 200 MG tablet, 1 tablet (200 mg) by mouth 2 times daily for 10 days SUSCEPTIBLE  Creatinine Level (mg/dl)   Creatinine   Date Value Ref Range Status   07/10/2020 2.20 (H) 0.70 - 1.30 mg/dL Final   10/10/2016 0.87 0.66 - 1.25 mg/dL Final    Creatinine clearance (ml/min), if applicable    Creatinine clearance cannot be calculated (Patient's most recent lab result is older than the maximum 365 days allowed.)     Patient's current Symptoms:   Patient reports he is \"doing good\". Feels the medication is starting to take effect. No more pain.     RN Recommendations/Instructions per [place] ED lab result protocol:   [facility] ED lab result protocol utilized: Urine culture  Reviewed completely full course of ABX and return precautions. All questions answered.   Patient/care giver notified to contact your PCP clinic or return to the Emergency department if your:  Symptoms return.  Symptoms do not improve after 3 days on antibiotic.  Symptoms do not resolve after completing antibiotic.  Symptoms worsen or other concerning symptoms.    Joseph Joe RN   "

## 2025-02-05 ENCOUNTER — OFFICE VISIT (OUTPATIENT)
Dept: CARDIOLOGY | Facility: CLINIC | Age: 60
End: 2025-02-05
Payer: COMMERCIAL

## 2025-02-05 VITALS
SYSTOLIC BLOOD PRESSURE: 166 MMHG | BODY MASS INDEX: 31.75 KG/M2 | WEIGHT: 247.4 LBS | HEIGHT: 74 IN | OXYGEN SATURATION: 97 % | DIASTOLIC BLOOD PRESSURE: 88 MMHG | HEART RATE: 60 BPM

## 2025-02-05 DIAGNOSIS — Z13.6 SCREENING FOR CARDIOVASCULAR CONDITION: Primary | ICD-10-CM

## 2025-02-05 DIAGNOSIS — I25.10 CORONARY ARTERY DISEASE INVOLVING NATIVE CORONARY ARTERY OF NATIVE HEART WITHOUT ANGINA PECTORIS: ICD-10-CM

## 2025-02-05 RX ORDER — GABAPENTIN 300 MG/1
300 CAPSULE ORAL AT BEDTIME
COMMUNITY
Start: 2024-09-11

## 2025-02-05 NOTE — LETTER
2/5/2025    Provider Not In System       RE: Dimitry Rick Sr.       Dear Colleague,     I had the pleasure of seeing Dimitry Rick Sr. in the [facility].  CARDIOLOGY CLINIC CONSULTATION    PRIMARY CARE PHYSICIAN:  Provider Not In System    HISTORY OF PRESENT ILLNESS:  The patient is a very pleasant 59-year-old gentleman with known coronary disease, hypertension, hyperlipidemia, diabetes and nicotine dependence.    He suffered non-ST elevation MI in May 2021.  He underwent coronary angiography at an outside hospital which demonstrated high-grade stenosis involving the mid LAD.  He had a successful PCI and stent was placed in the LAD.  Per report, echocardiogram demonstrated normal LV function.    Since his acute coronary event in 2021, he has done quite well from cardiac point of view.  He does not endorse any significant cardiopulmonary symptoms.  Specifically, no chest pain or exertional shortness of breath.  Electrocardiogram obtained in the office today shows sinus rhythm and no ischemic changes.    PAST MEDICAL HISTORY:  Past Medical History:   Diagnosis Date     Essential hypertension, benign     added atenolol 3/06 (HCTZ se urinary freq)     Hyperlipidemia     on lipitor- AST elevated with WRIGHT per GI, okay to do statins with monitoring     Juvenile rheumatoid arthritis      Type 2 diabetes mellitus 01/01/2002    metformin 1000mg       MEDICATIONS:  Current Outpatient Medications   Medication Sig Dispense Refill     ASPIRIN LOW DOSE 81 MG chewable tablet TAKE 1 TABLET (81 MG) BY MOUTH DAILY 30 tablet 0     FIBERCON 625 MG OR TABS 1 tab q d 30 0     gabapentin (NEURONTIN) 300 MG capsule Take 300 mg by mouth at bedtime.       glipiZIDE (GLUCOTROL XL) 5 MG 24 hr tablet Take 1 tablet (5 mg) by mouth daily 30 tablet 0     lisinopril (PRINIVIL/ZESTRIL) 40 MG tablet TAKE 1 TABLET (40 MG) BY MOUTH DAILY 30 tablet 0     metFORMIN (GLUCOPHAGE-XR) 500 MG 24 hr tablet TAKE 4 TABLETS (2,000 MG)  BY MOUTH DAILY (WITH BREAKFAST) 120 tablet 0     simvastatin (ZOCOR) 80 MG tablet TAKE 1 TABLET (80 MG) BY MOUTH AT BEDTIME 30 tablet 0     No current facility-administered medications for this visit.       SOCIAL HISTORY:  I have reviewed this patient's social history and updated it with pertinent information if needed. Dimitry STOREY Prabhjot Lynch.  reports that he quit smoking about 4 years ago. His smoking use included cigarettes. He has never used smokeless tobacco. He reports current alcohol use of about 2.0 standard drinks of alcohol per week. He reports current drug use. Frequency: 7.00 times per week. Drug: Marijuana.    PHYSICAL EXAM:  Pulse:  [60] 60  BP: (166)/(88) 166/88  SpO2:  [97 %] 97 %  247 lbs 6.4 oz    Constitutional: alert, no distress  Respiratory: Good bilateral air entry  Cardiovascular: Regular rhythm, no murmur  GI: nondistended  Neuropsychiatric: appropriate affact    ASSESSMENT: Pertinent issues addressed/ reviewed during this cardiology visit  Coronary disease status post prior LAD stenting  Hypertension, elevated in the office today  Hyperlipidemia with prior statin intolerance, currently on simvastatin.  Type 2 diabetes  Nicotine dependence    RECOMMENDATIONS:  He is quite stable from cardiac point of view.  Will obtain echocardiogram to assess his LV function.  His last lipid profile showed an LDL of 52.  He was previously on Repatha therefore it is unclear if the lipids were obtained while on Repatha.  He is currently on simvastatin.  He has been intolerant of other more potent statins in the past.  Recheck lipid profile with primary care    I instructed him to check his blood pressure at home.  If systolic blood pressures consistently above 140, he will let us know and we will most likely add another antihypertensive agent.    Discussed importance of nicotine cessation.    Follow-up in 1 year but sooner if there are symptoms    It was a pleasure seeing this patient in clinic today. Please  do not hesitate to contact me with any future questions.     Rajesh Pool MD, [facility]  Cardiology - [facility]  February 5, 2025    Review of the result(s) of each unique test - Last ECG, BMP, CBC, lipid profile     The level of medical decision making during this visit was of moderate complexity.    The longitudinal plan of care for the diagnosis(es)/condition(s) as documented were addressed during this visit. Due to the added complexity in care, I will continue to support Dimitry in the subsequent management and with ongoing continuity of care.     This note was completed in part using dictation via the Dragon voice recognition software. Some word and grammatical errors may occur and must be interpreted in the appropriate clinical context.  If there are any questions pertaining to this issue, please contact me for further clarification.      Thank you for allowing me to participate in the care of your patient.      Sincerely,     Rajesh Pool MD, MD     [facility]  cc:   Referred Self, MD  No address on file

## 2025-02-05 NOTE — PROGRESS NOTES
CARDIOLOGY CLINIC CONSULTATION    PRIMARY CARE PHYSICIAN:  Provider Not In System    HISTORY OF PRESENT ILLNESS:  The patient is a very pleasant 59-year-old gentleman with known coronary disease, hypertension, hyperlipidemia, diabetes and nicotine dependence.    He suffered non-ST elevation MI in May 2021.  He underwent coronary angiography at an outside hospital which demonstrated high-grade stenosis involving the mid LAD.  He had a successful PCI and stent was placed in the LAD.  Per report, echocardiogram demonstrated normal LV function.    Since his acute coronary event in 2021, he has done quite well from cardiac point of view.  He does not endorse any significant cardiopulmonary symptoms.  Specifically, no chest pain or exertional shortness of breath.  Electrocardiogram obtained in the office today shows sinus rhythm and no ischemic changes.    PAST MEDICAL HISTORY:  Past Medical History:   Diagnosis Date    Essential hypertension, benign     added atenolol 3/06 (HCTZ se urinary freq)    Hyperlipidemia     on lipitor- AST elevated with WRIGHT per GI, okay to do statins with monitoring    Juvenile rheumatoid arthritis     Type 2 diabetes mellitus 01/01/2002    metformin 1000mg       MEDICATIONS:  Current Outpatient Medications   Medication Sig Dispense Refill    ASPIRIN LOW DOSE 81 MG chewable tablet TAKE 1 TABLET (81 MG) BY MOUTH DAILY 30 tablet 0    FIBERCON 625 MG OR TABS 1 tab q d 30 0    gabapentin (NEURONTIN) 300 MG capsule Take 300 mg by mouth at bedtime.      glipiZIDE (GLUCOTROL XL) 5 MG 24 hr tablet Take 1 tablet (5 mg) by mouth daily 30 tablet 0    lisinopril (PRINIVIL/ZESTRIL) 40 MG tablet TAKE 1 TABLET (40 MG) BY MOUTH DAILY 30 tablet 0    metFORMIN (GLUCOPHAGE-XR) 500 MG 24 hr tablet TAKE 4 TABLETS (2,000 MG) BY MOUTH DAILY (WITH BREAKFAST) 120 tablet 0    simvastatin (ZOCOR) 80 MG tablet TAKE 1 TABLET (80 MG) BY MOUTH AT BEDTIME 30 tablet 0     No current facility-administered medications for this  visit.       SOCIAL HISTORY:  I have reviewed this patient's social history and updated it with pertinent information if needed. Dimitry Rick Sr.  reports that he quit smoking about 4 years ago. His smoking use included cigarettes. He has never used smokeless tobacco. He reports current alcohol use of about 2.0 standard drinks of alcohol per week. He reports current drug use. Frequency: 7.00 times per week. Drug: Marijuana.    PHYSICAL EXAM:  Pulse:  [60] 60  BP: (166)/(88) 166/88  SpO2:  [97 %] 97 %  247 lbs 6.4 oz    Constitutional: alert, no distress  Respiratory: Good bilateral air entry  Cardiovascular: Regular rhythm, no murmur  GI: nondistended  Neuropsychiatric: appropriate affact    ASSESSMENT: Pertinent issues addressed/ reviewed during this cardiology visit  Coronary disease status post prior LAD stenting  Hypertension, elevated in the office today  Hyperlipidemia with prior statin intolerance, currently on simvastatin.  Type 2 diabetes  Nicotine dependence    RECOMMENDATIONS:  He is quite stable from cardiac point of view.  Will obtain echocardiogram to assess his LV function.  His last lipid profile showed an LDL of 52.  He was previously on Repatha therefore it is unclear if the lipids were obtained while on Repatha.  He is currently on simvastatin.  He has been intolerant of other more potent statins in the past.  Recheck lipid profile with primary care    I instructed him to check his blood pressure at home.  If systolic blood pressures consistently above 140, he will let us know and we will most likely add another antihypertensive agent.    Discussed importance of nicotine cessation.    Follow-up in 1 year but sooner if there are symptoms    It was a pleasure seeing this patient in clinic today. Please do not hesitate to contact me with any future questions.     Rajesh Pool MD, FAC  Cardiology - [facility]  February 5, 2025    Review of the result(s) of each unique test - Last ECG, BMP, CBC,  lipid profile     The level of medical decision making during this visit was of moderate complexity.    The longitudinal plan of care for the diagnosis(es)/condition(s) as documented were addressed during this visit. Due to the added complexity in care, I will continue to support Dimitry in the subsequent management and with ongoing continuity of care.     This note was completed in part using dictation via the Dragon voice recognition software. Some word and grammatical errors may occur and must be interpreted in the appropriate clinical context.  If there are any questions pertaining to this issue, please contact me for further clarification.

## 2025-02-11 ENCOUNTER — OFFICE VISIT (OUTPATIENT)
Dept: FAMILY MEDICINE | Facility: CLINIC | Age: 60
End: 2025-02-11
Payer: COMMERCIAL

## 2025-02-11 VITALS
WEIGHT: 243 LBS | SYSTOLIC BLOOD PRESSURE: 158 MMHG | BODY MASS INDEX: 31.18 KG/M2 | HEART RATE: 62 BPM | HEIGHT: 74 IN | OXYGEN SATURATION: 99 % | DIASTOLIC BLOOD PRESSURE: 99 MMHG | RESPIRATION RATE: 21 BRPM | TEMPERATURE: 98.2 F

## 2025-02-11 DIAGNOSIS — E78.5 HYPERLIPIDEMIA LDL GOAL <100: ICD-10-CM

## 2025-02-11 DIAGNOSIS — I10 ESSENTIAL HYPERTENSION WITH GOAL BLOOD PRESSURE LESS THAN 140/90: ICD-10-CM

## 2025-02-11 DIAGNOSIS — E11.9 CONTROLLED TYPE 2 DIABETES MELLITUS WITHOUT COMPLICATION, WITHOUT LONG-TERM CURRENT USE OF INSULIN (H): ICD-10-CM

## 2025-02-11 DIAGNOSIS — G25.81 RESTLESS LEGS SYNDROME (RLS): Primary | ICD-10-CM

## 2025-02-11 DIAGNOSIS — M08.00 JRA (JUVENILE RHEUMATOID ARTHRITIS) (H): ICD-10-CM

## 2025-02-11 LAB
EST. AVERAGE GLUCOSE BLD GHB EST-MCNC: 137 MG/DL
HBA1C MFR BLD: 6.4 % (ref 0–5.6)

## 2025-02-11 PROCEDURE — 36415 COLL VENOUS BLD VENIPUNCTURE: CPT | Performed by: FAMILY MEDICINE

## 2025-02-11 PROCEDURE — 80048 BASIC METABOLIC PNL TOTAL CA: CPT | Performed by: FAMILY MEDICINE

## 2025-02-11 PROCEDURE — 80061 LIPID PANEL: CPT | Performed by: FAMILY MEDICINE

## 2025-02-11 PROCEDURE — 83036 HEMOGLOBIN GLYCOSYLATED A1C: CPT | Performed by: FAMILY MEDICINE

## 2025-02-11 PROCEDURE — 99204 OFFICE O/P NEW MOD 45 MIN: CPT | Performed by: FAMILY MEDICINE

## 2025-02-11 PROCEDURE — 82043 UR ALBUMIN QUANTITATIVE: CPT | Performed by: FAMILY MEDICINE

## 2025-02-11 PROCEDURE — 82570 ASSAY OF URINE CREATININE: CPT | Performed by: FAMILY MEDICINE

## 2025-02-11 RX ORDER — EZETIMIBE 10 MG/1
10 TABLET ORAL DAILY
Qty: 90 TABLET | Refills: 3 | Status: SHIPPED | OUTPATIENT
Start: 2025-02-11

## 2025-02-11 RX ORDER — METFORMIN HYDROCHLORIDE 500 MG/1
2000 TABLET, EXTENDED RELEASE ORAL
Qty: 360 TABLET | OUTPATIENT
Start: 2025-02-11

## 2025-02-11 RX ORDER — METOPROLOL TARTRATE 25 MG/1
25 TABLET, FILM COATED ORAL
COMMUNITY
Start: 2024-04-30 | End: 2025-02-11

## 2025-02-11 RX ORDER — METOPROLOL TARTRATE 25 MG/1
25 TABLET, FILM COATED ORAL 2 TIMES DAILY
Qty: 180 TABLET | Refills: 1 | Status: SHIPPED | OUTPATIENT
Start: 2025-02-11

## 2025-02-11 RX ORDER — OLMESARTAN MEDOXOMIL 20 MG/1
20 TABLET ORAL DAILY
Qty: 90 TABLET | OUTPATIENT
Start: 2025-02-11

## 2025-02-11 RX ORDER — OLMESARTAN MEDOXOMIL 20 MG/1
20 TABLET ORAL DAILY
Qty: 30 TABLET | Refills: 1 | Status: SHIPPED | OUTPATIENT
Start: 2025-02-11

## 2025-02-11 RX ORDER — ATORVASTATIN CALCIUM 40 MG/1
40 TABLET, FILM COATED ORAL DAILY
Qty: 90 TABLET | Refills: 1 | Status: SHIPPED | OUTPATIENT
Start: 2025-02-11

## 2025-02-11 RX ORDER — EZETIMIBE 10 MG/1
10 TABLET ORAL DAILY
COMMUNITY
Start: 2024-10-03 | End: 2025-02-11

## 2025-02-11 RX ORDER — GABAPENTIN 300 MG/1
300 CAPSULE ORAL AT BEDTIME
Qty: 90 CAPSULE | Refills: 3 | Status: SHIPPED | OUTPATIENT
Start: 2025-02-11

## 2025-02-11 RX ORDER — METFORMIN HYDROCHLORIDE 500 MG/1
2000 TABLET, EXTENDED RELEASE ORAL
Qty: 120 TABLET | Refills: 0 | Status: SHIPPED | OUTPATIENT
Start: 2025-02-11

## 2025-02-11 RX ORDER — GLIPIZIDE 5 MG/1
5 TABLET, FILM COATED, EXTENDED RELEASE ORAL DAILY
Qty: 30 TABLET | Refills: 0 | Status: SHIPPED | OUTPATIENT
Start: 2025-02-11

## 2025-02-11 RX ORDER — GLIPIZIDE 5 MG/1
5 TABLET, FILM COATED, EXTENDED RELEASE ORAL DAILY
Qty: 90 TABLET | OUTPATIENT
Start: 2025-02-11

## 2025-02-11 ASSESSMENT — PAIN SCALES - GENERAL: PAINLEVEL_OUTOF10: NO PAIN (0)

## 2025-02-11 NOTE — PROGRESS NOTES
Preventive Care Visit  [facility]  Deven Sandoval MD, Family Medicine  Feb 11, 2025      Assessment and Plan    (G25.81) Restless legs syndrome (RLS)  Comment: refill, PL updated  Plan: gabapentin (NEURONTIN) 300 MG capsule            (E11.9) Controlled type 2 diabetes mellitus without complication, without long-term current use of insulin (H)  Comment: Pt reports using Jardiance, this is refilled and added to his list.  I would like to get rid of glipzide if possible, will monitor A1c, but this looks good today  Plan: HEMOGLOBIN A1C, Albumin Random Urine         Quantitative with Creat Ratio, glipiZIDE         (GLUCOTROL XL) 5 MG 24 hr tablet, metFORMIN         (GLUCOPHAGE XR) 500 MG 24 hr tablet,         empagliflozin (JARDIANCE) 25 MG TABS tablet            (I10) Essential hypertension with goal blood pressure less than 140/90  Comment: swtiching lisinopril for olmesartan, may need to consider adding CCB or diuretic if BP remains high  Plan: metoprolol tartrate (LOPRESSOR) 25 MG tablet,         olmesartan (BENICAR) 20 MG tablet            (E78.5) Hyperlipidemia LDL goal <100  Comment: refillilng meds, fasting labs  Plan: Lipid panel reflex to direct LDL Fasting,         ezetimibe (ZETIA) 10 MG tablet, atorvastatin         (LIPITOR) 40 MG tablet            (M08.00) JRA (juvenile rheumatoid arthritis) (H)  Comment: adding to PL  Plan:       RTC in 1m BP check    Deven Sandoval MD     Nicole Moraes is a 59 year old, presenting for the following:  Establish Care        2/11/2025     1:11 PM   Additional Questions   Roomed by Sheba [facility]        Via the Health Maintenance questionnaire, the patient has reported the following services have been completed -[facility] Exam: [facility] 2024-04-12-Colonscopy: specialty clinic [facility] 2023-05-29, this information has been sent to the abstraction team.    Would like to establish care.  Recently moved to the area from  [place].      History of Present Illness       Diabetes:   He presents for follow up of diabetes.  He is checking home blood glucose a few times a month.   He checks blood glucose before and after meals.  Blood glucose is never over 200 and never under 70.  When his blood glucose is low, the patient is asymptomatic for confusion, blurred vision, lethargy and reports not feeling dizzy, shaky, or weak.  He is concerned about other.   He is having numbness in feet, burning in feet and weight gain.  The patient has had a diabetic eye exam in the last 12 months. Eye exam performed on april 2024. Location of last eye exam [facility].        Hypertension: He presents for follow up of hypertension.  He does not check blood pressure  regularly outside of the clinic. Outside blood pressures have been over 140/90. He does not follow a low salt diet.     Vascular Disease:  He presents for follow up of vascular disease.     He never takes nitroglycerin. He takes daily aspirin.    He eats 2-3 servings of fruits and vegetables daily.He consumes 0 sweetened beverage(s) daily.He exercises with enough effort to increase his heart rate 30 to 60 minutes per day.  He exercises with enough effort to increase his heart rate 3 or less days per week. He is missing 1 dose(s) of medications per week.  He is not taking prescribed medications regularly due to remembering to take.    Generally feeling well, no specific concerns.    PmHx, SHx, FHx reviewed and updated.     Health Care Directive  Patient does not have a Health Care Directive:        No data to display                   No data to display                   No data to display                  2/11/2025   Social Factors   Worry food won't last until get money to buy more No   Food not last or not have enough money for food? No   Do you have housing? (Housing is defined as stable permanent housing and does not include staying ouside in a car, in a tent, in an abandoned  "building, in an overnight shelter, or couch-surfing.) Yes   Are you worried about losing your housing? No   Lack of transportation? No   Unable to get utilities (heat,electricity)? No          No data to display                     Today's PHQ-2 Score:       2025     1:00 PM   PHQ-2 (  [employer])   Q1: Little interest or pleasure in doing things 0   Q2: Feeling down, depressed or hopeless 0   PHQ-2 Score 0    Q1: Little interest or pleasure in doing things Not at all   Q2: Feeling down, depressed or hopeless Not at all   PHQ-2 Score 0       Patient-reported            No data to display              Social History     Tobacco Use    Smoking status: Former     Current packs/day: 0.00     Types: Cigarettes     Quit date:      Years since quittin.1    Smokeless tobacco: Never   Vaping Use    Vaping status: Never Used   Substance Use Topics    Alcohol use: Yes     Alcohol/week: 2.0 standard drinks of alcohol     Types: 2 Standard drinks or equivalent per week     Comment: Couple of shots every night    Drug use: Yes     Frequency: 7.0 times per week     Types: Marijuana     Comment: Once daily       Last PSA: No results found for: \"PSA\"  ASCVD Risk   The ASCVD Risk score (Julio CABRERA, et al., 2019) failed to calculate for the following reasons:    Cannot find a previous HDL lab    Cannot find a previous total cholesterol lab           Reviewed and updated as needed this visit by Provider                    PmHx, SHx, FHx reviewed and updated.          Objective    Exam  BP (!) 158/99 (BP Location: Right arm, Patient Position: Sitting, Cuff Size: Adult Large)   Pulse 62   Temp 98.2  F (36.8  C) (Oral)   Resp 21   Ht 1.88 m (6' 2\")   Wt 110.2 kg (243 lb)   SpO2 99%   BMI 31.20 kg/m     Estimated body mass index is 31.2 kg/m  as calculated from the following:    Height as of this encounter: 1.88 m (6' 2\").    Weight as of this encounter: 110.2 kg (243 lb).    Physical Exam  Vitals and nursing " note reviewed.   HENT:      Head: Normocephalic.   Eyes:      Conjunctiva/sclera: Conjunctivae normal.   Cardiovascular:      Rate and Rhythm: Normal rate and regular rhythm.      Heart sounds: Normal heart sounds.   Pulmonary:      Effort: Pulmonary effort is normal.      Breath sounds: Normal breath sounds.   Skin:     General: Skin is warm and dry.   Neurological:      General: No focal deficit present.      Mental Status: He is alert and oriented to person, place, and time.   Psychiatric:         Mood and Affect: Mood normal.         Behavior: Behavior normal.           Signed Electronically by: Deven Sandoval MD

## 2025-02-12 PROBLEM — D12.6 COLON ADENOMA: Status: ACTIVE | Noted: 2025-02-12

## 2025-02-12 LAB
ANION GAP SERPL CALCULATED.3IONS-SCNC: 12 MMOL/L (ref 7–15)
BUN SERPL-MCNC: 17.5 MG/DL (ref 6–20)
CALCIUM SERPL-MCNC: 9.6 MG/DL (ref 8.8–10.4)
CHLORIDE SERPL-SCNC: 106 MMOL/L (ref 98–107)
CHOLEST SERPL-MCNC: 186 MG/DL
CREAT SERPL-MCNC: 1.05 MG/DL (ref 0.67–1.17)
CREAT UR-MCNC: 227 MG/DL
EGFRCR SERPLBLD CKD-EPI 2021: 83 ML/MIN/1.73M2
FASTING STATUS PATIENT QL REPORTED: YES
FASTING STATUS PATIENT QL REPORTED: YES
GLUCOSE SERPL-MCNC: 111 MG/DL (ref 70–99)
HCO3 SERPL-SCNC: 25 MMOL/L (ref 22–29)
HDLC SERPL-MCNC: 63 MG/DL
LDLC SERPL CALC-MCNC: 109 MG/DL
MICROALBUMIN UR-MCNC: 19.3 MG/L
MICROALBUMIN/CREAT UR: 8.5 MG/G CR (ref 0–17)
NONHDLC SERPL-MCNC: 123 MG/DL
POTASSIUM SERPL-SCNC: 4.1 MMOL/L (ref 3.4–5.3)
SODIUM SERPL-SCNC: 143 MMOL/L (ref 135–145)
TRIGL SERPL-MCNC: 72 MG/DL

## 2025-02-17 ENCOUNTER — TELEPHONE (OUTPATIENT)
Dept: FAMILY MEDICINE | Facility: CLINIC | Age: 60
End: 2025-02-17
Payer: COMMERCIAL

## 2025-02-17 NOTE — TELEPHONE ENCOUNTER
"Patient calling to review medications he should be taking. He had an OV with Dr. Sandoval on 2/11/25. Reviewed note and plan with patient.     He is wondering if he should stop the glipizide. Per OV note \"I would like to get rid of glipzide if possible, will monitor A1c, but this looks good today\" A1c was 6.5. If he should stop this, please discontinue Glipizide and lisinopril from med list.     Patient states the pharmacy says it is too soon for a refill of ezetimibe (ZETIA) 10 MG tablet. RN called [facility] and spoke to [place]. He got a 90 day supply on 1/7/25.     Bianca Polanco RN 2/17/2025 8:49 AM  [facility]             "

## 2025-02-18 NOTE — TELEPHONE ENCOUNTER
I'm not making any changes to his medications yet.  We will discuss that at his f/up appt.    Deven Sandoval MD

## 2025-02-18 NOTE — TELEPHONE ENCOUNTER
Patient returned call. Reviewed provider response. Patient verbalized understanding and denies questions. He will discuss further with provider at his appointment on 3/18/25.    Patient did find the ezetimibe (ZETIA) 10 MG tablets.    Bianca Polanco RN 2/18/2025 5:19 PM  [facility]

## 2025-02-18 NOTE — TELEPHONE ENCOUNTER
Called patient, left voicemail, and asked patient to call back. Attempt # 1.     Bianca Polanco RN 2/18/2025  [facility]

## 2025-03-13 DIAGNOSIS — E11.9 CONTROLLED TYPE 2 DIABETES MELLITUS WITHOUT COMPLICATION, WITHOUT LONG-TERM CURRENT USE OF INSULIN (H): ICD-10-CM

## 2025-03-13 RX ORDER — METFORMIN HYDROCHLORIDE 500 MG/1
2000 TABLET, EXTENDED RELEASE ORAL
Qty: 120 TABLET | Refills: 3 | Status: SHIPPED | OUTPATIENT
Start: 2025-03-13

## 2025-03-13 RX ORDER — GLIPIZIDE 5 MG/1
5 TABLET, FILM COATED, EXTENDED RELEASE ORAL DAILY
Qty: 30 TABLET | Refills: 3 | Status: SHIPPED | OUTPATIENT
Start: 2025-03-13

## 2025-03-17 ENCOUNTER — HOSPITAL ENCOUNTER (OUTPATIENT)
Dept: CARDIOLOGY | Facility: CLINIC | Age: 60
Discharge: HOME | End: 2025-03-17
Attending: INTERNAL MEDICINE | Admitting: INTERNAL MEDICINE
Payer: COMMERCIAL

## 2025-03-17 DIAGNOSIS — I25.10 CORONARY ARTERY DISEASE INVOLVING NATIVE CORONARY ARTERY OF NATIVE HEART WITHOUT ANGINA PECTORIS: ICD-10-CM

## 2025-03-17 LAB — LVEF ECHO: NORMAL

## 2025-03-17 PROCEDURE — 93306 TTE W/DOPPLER COMPLETE: CPT

## 2025-03-17 PROCEDURE — 93306 TTE W/DOPPLER COMPLETE: CPT | Mod: 26 | Performed by: INTERNAL MEDICINE

## 2025-03-18 ENCOUNTER — OFFICE VISIT (OUTPATIENT)
Dept: FAMILY MEDICINE | Facility: CLINIC | Age: 60
End: 2025-03-18
Payer: COMMERCIAL

## 2025-03-18 VITALS
WEIGHT: 239.8 LBS | HEART RATE: 66 BPM | DIASTOLIC BLOOD PRESSURE: 81 MMHG | TEMPERATURE: 97.8 F | OXYGEN SATURATION: 98 % | HEIGHT: 75 IN | RESPIRATION RATE: 20 BRPM | SYSTOLIC BLOOD PRESSURE: 128 MMHG | BODY MASS INDEX: 29.82 KG/M2

## 2025-03-18 DIAGNOSIS — I10 ESSENTIAL HYPERTENSION WITH GOAL BLOOD PRESSURE LESS THAN 140/90: Primary | ICD-10-CM

## 2025-03-18 DIAGNOSIS — E11.9 CONTROLLED TYPE 2 DIABETES MELLITUS WITHOUT COMPLICATION, WITHOUT LONG-TERM CURRENT USE OF INSULIN (H): ICD-10-CM

## 2025-03-18 PROCEDURE — 3074F SYST BP LT 130 MM HG: CPT | Performed by: FAMILY MEDICINE

## 2025-03-18 PROCEDURE — 99214 OFFICE O/P EST MOD 30 MIN: CPT | Performed by: FAMILY MEDICINE

## 2025-03-18 PROCEDURE — 3079F DIAST BP 80-89 MM HG: CPT | Performed by: FAMILY MEDICINE

## 2025-03-18 RX ORDER — OLMESARTAN MEDOXOMIL 20 MG/1
20 TABLET ORAL DAILY
Qty: 90 TABLET | Refills: 1 | Status: SHIPPED | OUTPATIENT
Start: 2025-03-18

## 2025-03-18 NOTE — PROGRESS NOTES
Assessment and Plan    (I10) Essential hypertension with goal blood pressure less than 140/90  (primary encounter diagnosis)  Comment: BP looks much better - ongoing refill and follow up labs  Plan: olmesartan (BENICAR) 20 MG tablet, Basic         metabolic panel  (Ca, Cl, CO2, Creat, Gluc, K,         Na, BUN)            (E11.9) Controlled type 2 diabetes mellitus without complication, without long-term current use of insulin (H)  Comment: last A1c at goal, recheck in 3m  Plan: Hemoglobin A1c, PRIMARY CARE FOLLOW-UP         SCHEDULING              RTC in 6m    Deven Sandoval MD     Nicole Grimaldo is a 59 year old, presenting for the following health issues:  Follow Up ( olmesartan)        3/18/2025    10:42 AM   Additional Questions   Roomed by Peri CANTU   Accompanied by self         3/18/2025    10:42 AM   Patient Reported Additional Medications   Patient reports taking the following new medications n/a     History of Present Illness       Diabetes:   He presents for follow up of diabetes.  He is checking home blood glucose a few times a month.   He checks blood glucose before meals.  Blood glucose is never over 200 and never under 70.  When his blood glucose is low, the patient is asymptomatic for confusion, blurred vision, lethargy and reports not feeling dizzy, shaky, or weak.  He is concerned about other.   He is having numbness in feet and burning in feet.  The patient has had a diabetic eye exam in the last 12 months. Eye exam performed on June 2024. Location of last eye exam [facility].        Vascular Disease:  He presents for follow up of vascular disease.     He never takes nitroglycerin. He takes daily aspirin.    He eats 2-3 servings of fruits and vegetables daily.He consumes 0 sweetened beverage(s) daily.He exercises with enough effort to increase his heart rate 30 to 60 minutes per day.  He exercises with enough effort to increase his heart rate 3 or less days per week. He is missing 1  "dose(s) of medications per week.  He is not taking prescribed medications regularly due to remembering to take.        Tolerating new BP medication well.  Denies CP, palpitations, edema, dyspnea, HA, vision changes.     Reviewed echocardiogram from yesterday.    Has not been using glipizide, not sure if he is suppsed to?      Objective    /81 (BP Location: Right arm, Patient Position: Sitting, Cuff Size: Adult Large)   Pulse 66   Temp 97.8  F (36.6  C) (Oral)   Resp 20   Ht 1.905 m (6' 3\")   Wt 108.8 kg (239 lb 12.8 oz)   SpO2 98%   BMI 29.97 kg/m    Body mass index is 29.97 kg/m .  Physical Exam  Vitals and nursing note reviewed.   HENT:      Head: Normocephalic.   Eyes:      Conjunctiva/sclera: Conjunctivae normal.   Cardiovascular:      Rate and Rhythm: Normal rate and regular rhythm.      Heart sounds: Normal heart sounds.   Pulmonary:      Effort: Pulmonary effort is normal.      Breath sounds: Normal breath sounds.   Skin:     General: Skin is warm and dry.   Neurological:      General: No focal deficit present.      Mental Status: He is alert and oriented to person, place, and time.   Psychiatric:         Mood and Affect: Mood normal.         Behavior: Behavior normal.            Signed Electronically by: Deven Sandoval MD    "

## 2025-04-15 ENCOUNTER — PATIENT OUTREACH (OUTPATIENT)
Dept: CARE COORDINATION | Facility: CLINIC | Age: 60
End: 2025-04-15
Payer: COMMERCIAL

## 2025-05-13 ENCOUNTER — TELEPHONE (OUTPATIENT)
Dept: FAMILY MEDICINE | Facility: CLINIC | Age: 60
End: 2025-05-13

## 2025-05-13 ENCOUNTER — OFFICE VISIT (OUTPATIENT)
Dept: FAMILY MEDICINE | Facility: CLINIC | Age: 60
End: 2025-05-13
Attending: FAMILY MEDICINE
Payer: COMMERCIAL

## 2025-05-13 VITALS
SYSTOLIC BLOOD PRESSURE: 123 MMHG | HEIGHT: 75 IN | WEIGHT: 235 LBS | DIASTOLIC BLOOD PRESSURE: 76 MMHG | BODY MASS INDEX: 29.22 KG/M2 | HEART RATE: 59 BPM | TEMPERATURE: 97.4 F | OXYGEN SATURATION: 98 % | RESPIRATION RATE: 16 BRPM

## 2025-05-13 DIAGNOSIS — I10 HYPERTENSION GOAL BP (BLOOD PRESSURE) < 140/90: ICD-10-CM

## 2025-05-13 DIAGNOSIS — E78.5 HYPERLIPIDEMIA LDL GOAL <100: ICD-10-CM

## 2025-05-13 DIAGNOSIS — E11.9 CONTROLLED TYPE 2 DIABETES MELLITUS WITHOUT COMPLICATION, WITHOUT LONG-TERM CURRENT USE OF INSULIN (H): Primary | ICD-10-CM

## 2025-05-13 LAB
ANION GAP SERPL CALCULATED.3IONS-SCNC: 13 MMOL/L (ref 7–15)
BUN SERPL-MCNC: 21.7 MG/DL (ref 6–20)
CALCIUM SERPL-MCNC: 9.7 MG/DL (ref 8.8–10.4)
CHLORIDE SERPL-SCNC: 108 MMOL/L (ref 98–107)
CHOLEST SERPL-MCNC: 118 MG/DL
CREAT SERPL-MCNC: 1.08 MG/DL (ref 0.67–1.17)
EGFRCR SERPLBLD CKD-EPI 2021: 80 ML/MIN/1.73M2
EST. AVERAGE GLUCOSE BLD GHB EST-MCNC: 134 MG/DL
GLUCOSE SERPL-MCNC: 121 MG/DL (ref 70–99)
HBA1C MFR BLD: 6.3 % (ref 0–5.6)
HCO3 SERPL-SCNC: 22 MMOL/L (ref 22–29)
HDLC SERPL-MCNC: 52 MG/DL
HOLD SPECIMEN: NORMAL
LDLC SERPL CALC-MCNC: 52 MG/DL
NONHDLC SERPL-MCNC: 66 MG/DL
POTASSIUM SERPL-SCNC: 4.1 MMOL/L (ref 3.4–5.3)
SODIUM SERPL-SCNC: 143 MMOL/L (ref 135–145)
TRIGL SERPL-MCNC: 71 MG/DL

## 2025-05-13 PROCEDURE — 36415 COLL VENOUS BLD VENIPUNCTURE: CPT | Performed by: FAMILY MEDICINE

## 2025-05-13 PROCEDURE — 83036 HEMOGLOBIN GLYCOSYLATED A1C: CPT | Performed by: FAMILY MEDICINE

## 2025-05-13 PROCEDURE — 80061 LIPID PANEL: CPT | Performed by: FAMILY MEDICINE

## 2025-05-13 PROCEDURE — 80048 BASIC METABOLIC PNL TOTAL CA: CPT | Performed by: FAMILY MEDICINE

## 2025-05-13 ASSESSMENT — PAIN SCALES - GENERAL: PAINLEVEL_OUTOF10: NO PAIN (0)

## 2025-05-13 NOTE — PROGRESS NOTES
"  {PROVIDER CHARTING PREFERENCE:001321}    Nicole Grimaldo is a 60 year old, presenting for the following health issues:  Diabetes        3/18/2025    10:42 AM   Additional Questions   Roomed by Peri CANTU   Accompanied by self     History of Present Illness       Diabetes:   He presents for follow up of diabetes.  He is checking home blood glucose a few times a month.   He checks blood glucose before and after meals.  Blood glucose is never over 200 and never under 70.  When his blood glucose is low, the patient is asymptomatic for confusion, blurred vision, lethargy and reports not feeling dizzy, shaky, or weak.   He has no concerns regarding his diabetes at this time.  He is having numbness in feet and excessive thirst.  The patient has not had a diabetic eye exam in the last 12 months.           Tolerating new BP well, as is he the atorvastatin.  Feeling well, no acute concerns today.    {ROS Picklists (Optional):413819}      Objective    /76   Pulse 59   Temp 97.4  F (36.3  C) (Oral)   Resp 16   Ht 1.905 m (6' 3\")   Wt 106.6 kg (235 lb)   SpO2 98%   BMI 29.37 kg/m    Body mass index is 29.37 kg/m .  Physical Exam  Vitals and nursing note reviewed.   Constitutional:       General: He is not in acute distress.     Appearance: He is well-developed.   HENT:      Head: Normocephalic.   Eyes:      Conjunctiva/sclera: Conjunctivae normal.   Cardiovascular:      Rate and Rhythm: Normal rate and regular rhythm.      Pulses:           Dorsalis pedis pulses are 2+ on the right side and 2+ on the left side.      Heart sounds: Normal heart sounds.   Pulmonary:      Effort: Pulmonary effort is normal.      Breath sounds: Normal breath sounds.   Skin:     General: Skin is warm and dry.      Comments: No callus, onychomycosis or wound STEVEN feet   Neurological:      General: No focal deficit present.      Mental Status: He is alert and oriented to person, place, and time.      Deep Tendon Reflexes:      Reflex " Scores:       Achilles reflexes are 2+ on the right side and 2+ on the left side.     Comments: Normal filament and proprioception STEVEN feet   Psychiatric:         Mood and Affect: Mood normal.         Behavior: Behavior normal.         Judgment: Judgment normal.                Signed Electronically by: Deven Sandoval MD  {Email feedback regarding this note to [email or web address]   :[id number]}

## 2025-05-13 NOTE — TELEPHONE ENCOUNTER
General Call      Reason for Call:  fasting    What are your questions or concerns:  is wondering if he needs to fast for his appt today    Date of last appointment with provider: 3-18-25    Okay to leave a detailed message?: Yes at Home number on file [phone] (home)

## 2025-05-14 ENCOUNTER — PATIENT OUTREACH (OUTPATIENT)
Dept: CARE COORDINATION | Facility: CLINIC | Age: 60
End: 2025-05-14
Payer: COMMERCIAL

## 2025-05-15 ENCOUNTER — OFFICE VISIT (OUTPATIENT)
Dept: FAMILY MEDICINE | Facility: CLINIC | Age: 60
End: 2025-05-15
Payer: COMMERCIAL

## 2025-05-15 VITALS
RESPIRATION RATE: 16 BRPM | HEART RATE: 74 BPM | SYSTOLIC BLOOD PRESSURE: 125 MMHG | DIASTOLIC BLOOD PRESSURE: 81 MMHG | HEIGHT: 75 IN | WEIGHT: 235 LBS | TEMPERATURE: 97.4 F | OXYGEN SATURATION: 99 % | BODY MASS INDEX: 29.22 KG/M2

## 2025-05-15 DIAGNOSIS — S49.92XA SHOULDER INJURY, LEFT, INITIAL ENCOUNTER: Primary | ICD-10-CM

## 2025-05-15 PROCEDURE — 1125F AMNT PAIN NOTED PAIN PRSNT: CPT | Performed by: FAMILY MEDICINE

## 2025-05-15 PROCEDURE — 3074F SYST BP LT 130 MM HG: CPT | Performed by: FAMILY MEDICINE

## 2025-05-15 PROCEDURE — 3079F DIAST BP 80-89 MM HG: CPT | Performed by: FAMILY MEDICINE

## 2025-05-15 PROCEDURE — 99214 OFFICE O/P EST MOD 30 MIN: CPT | Performed by: FAMILY MEDICINE

## 2025-05-15 RX ORDER — NAPROXEN 500 MG/1
500 TABLET ORAL 2 TIMES DAILY WITH MEALS
Qty: 60 TABLET | Refills: 0 | Status: SHIPPED | OUTPATIENT
Start: 2025-05-15

## 2025-05-15 ASSESSMENT — PAIN SCALES - GENERAL: PAINLEVEL_OUTOF10: SEVERE PAIN (7)

## 2025-05-15 NOTE — PATIENT INSTRUCTIONS
Pairing acetaminophen (Tylenol) with ibuprofen (Advil) has been shown to be as effective as morphine for controlling pain.    600-800 mg ibuprofen (3-4 tabs) with breakfast, lunch and dinner  1000 mg acetaminophen (2 extra strength tabs) at mid-morning, mid-afternoon and evening.    Do not take more than 3000mg of acetaminophen (6 extra strength tabs) in 24 hours.

## 2025-05-15 NOTE — PROGRESS NOTES
"  {PROVIDER CHARTING PREFERENCE:462713}    Subjective   Dillan is a 60 year old, presenting for the following health issues:  Fall        5/15/2025    10:42 AM   Additional Questions   Roomed by omari harp   Accompanied by self         5/15/2025    10:42 AM   Patient Reported Additional Medications   Patient reports taking the following new medications na     Fall    History of Present Illness       Reason for visit:  Injured my shoulder yesterdad evening.  Symptom onset:  Today  Symptoms include:  PAIN IN MY LEFT SHOULDER  CAN NOT REACH ACROSS MY BODY OR ABOVE MY HEAD.  Symptom intensity:  Severe  Symptom progression:  Staying the same  Had these symptoms before:  Yes  Has tried/received treatment for these symptoms:  Yes  Previous treatment was successful:  Yes  Prior treatment description:  Cordozone injections  What makes it worse:  Sleeping  What makes it better:  Moving around He is missing 1 dose(s) of medications per week.  He is not taking prescribed medications regularly due to remembering to take.          Watering grass yesterday and tripped over hose.  Feel straight forward, tried to catch himself on his hands but still \"face planted.\"  L shoulder pain across upper outside.  Hurts to reach across his body and can't raise his arm over shoulder height.  Hurts worse today than yesterday.  Took a couple of baby aspirin without benefit.  NO numbness or weakness in hand/arm.          Objective    /81   Pulse 74   Temp 97.4  F (36.3  C) (Oral)   Resp 16   Ht 1.905 m (6' 3\")   Wt 106.6 kg (235 lb)   SpO2 99%   BMI 29.37 kg/m    Body mass index is 29.37 kg/m .  Physical Exam   {Exam List (Optional):542707}    {Diagnostic Test Results (Optional):120434}        Signed Electronically by: Deven Sandoval MD  {Email feedback regarding this note to [email or web address]   :[id number]}  " Appearance: Normal appearance.   Musculoskeletal:      Left shoulder: No swelling. Normal range of motion. Normal strength.      Comments: L shoulder: no impingement, rotator cuff intact, negative beer can    Skin:     General: Skin is warm and dry.   Neurological:      General: No focal deficit present.      Mental Status: He is alert and oriented to person, place, and time.   Psychiatric:         Mood and Affect: Mood normal.         Behavior: Behavior normal.              Signed Electronically by: Deven Sandoval MD

## 2025-05-16 ENCOUNTER — RESULTS FOLLOW-UP (OUTPATIENT)
Dept: FAMILY MEDICINE | Facility: CLINIC | Age: 60
End: 2025-05-16

## 2025-05-16 NOTE — LETTER
May 16, 2025      Dillan Rick Sr.  [address] [place] [id number]      Dillan,     Your recent labs are within acceptable ranges.     Deven Sandoval MD     Resulted Orders   Basic metabolic panel  (Ca, Cl, CO2, Creat, Gluc, K, Na, BUN)   Result Value Ref Range    Sodium 143 135 - 145 mmol/L    Potassium 4.1 3.4 - 5.3 mmol/L    Chloride 108 (H) 98 - 107 mmol/L    Carbon Dioxide (CO2) 22 22 - 29 mmol/L    Anion Gap 13 7 - 15 mmol/L    Urea Nitrogen 21.7 (H) 6.0 - 20.0 mg/dL    Creatinine 1.08 0.67 - 1.17 mg/dL    GFR Estimate 80 >60 mL/min/1.73m2      Comment:      eGFR calculated using 2021 CKD-EPI equation.    Calcium 9.7 8.8 - 10.4 mg/dL    Glucose 121 (H) 70 - 99 mg/dL   Hemoglobin A1c   Result Value Ref Range    Estimated Average Glucose 134 (H) <117 mg/dL    Hemoglobin A1C 6.3 (H) 0.0 - 5.6 %      Comment:      Normal <5.7%   Prediabetes 5.7-6.4%    Diabetes 6.5% or higher     Note: Adopted from ADA consensus guidelines.   Lipid panel reflex to direct LDL Fasting   Result Value Ref Range    Cholesterol 118 <200 mg/dL    Triglycerides 71 <150 mg/dL    Direct Measure HDL 52 >=40 mg/dL    LDL Cholesterol Calculated 52 <100 mg/dL    Non HDL Cholesterol 66 <130 mg/dL    Narrative    Cholesterol  Desirable: < 200 mg/dL  Borderline High: 200 - 239 mg/dL  High: >= 240 mg/dL    Triglycerides  Normal: < 150 mg/dL  Borderline High: 150 - 199 mg/dL  High: 200-499 mg/dL  Very High: >= 500 mg/dL    Direct Measure HDL  Female: >= 50 mg/dL   Male: >= 40 mg/dL    LDL Cholesterol  Desirable: < 100 mg/dL  Above Desirable: 100 - 129 mg/dL   Borderline High: 130 - 159 mg/dL   High:  160 - 189 mg/dL   Very High: >= 190 mg/dL    Non HDL Cholesterol  Desirable: < 130 mg/dL  Above Desirable: 130 - 159 mg/dL  Borderline High: 160 - 189 mg/dL  High: 190 - 219 mg/dL  Very High: >= 220 mg/dL       If you have any questions or concerns, please call the clinic at the number listed above.       Sincerely,    [facility]  - [place]

## 2025-06-09 ENCOUNTER — TRANSFERRED RECORDS (OUTPATIENT)
Dept: MULTI SPECIALTY CLINIC | Facility: CLINIC | Age: 60
End: 2025-06-09

## 2025-06-09 LAB — RETINOPATHY: NORMAL

## 2025-06-11 DIAGNOSIS — S49.92XA SHOULDER INJURY, LEFT, INITIAL ENCOUNTER: ICD-10-CM

## 2025-06-11 RX ORDER — NAPROXEN 500 MG/1
500 TABLET ORAL 2 TIMES DAILY WITH MEALS
Qty: 60 TABLET | Refills: 0 | OUTPATIENT
Start: 2025-06-11

## 2025-06-11 NOTE — TELEPHONE ENCOUNTER
Contacted pt. Pt states he did not request this. He will call us to schedule if he feels he needs to continue taking it.    Sheba Chapman RN on 6/11/2025 at 11:00 AM

## 2025-06-11 NOTE — TELEPHONE ENCOUNTER
Did patient request this or was this auto refill? Usually this would not be an ongoing Rx; if symptoms persist it sounds like PCP wanted follow up. Please confirm with patient

## 2025-06-23 ENCOUNTER — LAB (OUTPATIENT)
Dept: LAB | Facility: CLINIC | Age: 60
End: 2025-06-23
Payer: COMMERCIAL

## 2025-06-23 DIAGNOSIS — E11.9 CONTROLLED TYPE 2 DIABETES MELLITUS WITHOUT COMPLICATION, WITHOUT LONG-TERM CURRENT USE OF INSULIN (H): ICD-10-CM

## 2025-06-23 LAB
EST. AVERAGE GLUCOSE BLD GHB EST-MCNC: 128 MG/DL
HBA1C MFR BLD: 6.1 % (ref 0–5.6)

## 2025-06-23 PROCEDURE — 83036 HEMOGLOBIN GLYCOSYLATED A1C: CPT

## 2025-06-23 PROCEDURE — 36415 COLL VENOUS BLD VENIPUNCTURE: CPT

## 2025-07-06 ENCOUNTER — OFFICE VISIT (OUTPATIENT)
Dept: URGENT CARE | Facility: URGENT CARE | Age: 60
End: 2025-07-06
Payer: COMMERCIAL

## 2025-07-06 VITALS
HEIGHT: 75 IN | TEMPERATURE: 97.8 F | BODY MASS INDEX: 28.23 KG/M2 | SYSTOLIC BLOOD PRESSURE: 112 MMHG | OXYGEN SATURATION: 98 % | RESPIRATION RATE: 16 BRPM | DIASTOLIC BLOOD PRESSURE: 74 MMHG | HEART RATE: 70 BPM | WEIGHT: 227 LBS

## 2025-07-06 DIAGNOSIS — L03.011 PARONYCHIA OF RIGHT THUMB: Primary | ICD-10-CM

## 2025-07-06 PROCEDURE — 3078F DIAST BP <80 MM HG: CPT | Performed by: PHYSICIAN ASSISTANT

## 2025-07-06 PROCEDURE — 10060 I&D ABSCESS SIMPLE/SINGLE: CPT | Performed by: PHYSICIAN ASSISTANT

## 2025-07-06 PROCEDURE — 3074F SYST BP LT 130 MM HG: CPT | Performed by: PHYSICIAN ASSISTANT

## 2025-07-06 PROCEDURE — 99213 OFFICE O/P EST LOW 20 MIN: CPT | Mod: 25 | Performed by: PHYSICIAN ASSISTANT

## 2025-07-06 PROCEDURE — 1125F AMNT PAIN NOTED PAIN PRSNT: CPT | Performed by: PHYSICIAN ASSISTANT

## 2025-07-06 RX ORDER — SULFAMETHOXAZOLE AND TRIMETHOPRIM 800; 160 MG/1; MG/1
1 TABLET ORAL 2 TIMES DAILY
Qty: 14 TABLET | Refills: 0 | Status: SHIPPED | OUTPATIENT
Start: 2025-07-06 | End: 2025-07-10

## 2025-07-06 ASSESSMENT — PAIN SCALES - GENERAL: PAINLEVEL_OUTOF10: SEVERE PAIN (8)

## 2025-07-06 NOTE — PROGRESS NOTES
SUBJECTIVE:  Dimitry Rick Sr. is a 60 year old male comes in with comes in with concerns for infection on the right thumb for the past 5 days.  States that thumb is becoming increasingly painful and swollen.  Appears to have a large pocket of fluid.  States that approximately 1 week prior he was cutting tomatoes and did get a small cut in the area.  Has been using some Tylenol along with ice.  No fevers have been noted.  Denies any numbness or tingling in the finger.  She is otherwise at baseline health.    Past Medical History:   Diagnosis Date    Essential hypertension, benign     added atenolol 3/06 (HCTZ se urinary freq)    Hyperlipidemia     on lipitor- AST elevated with WRIGHT per GI, okay to do statins with monitoring    Juvenile rheumatoid arthritis     Type 2 diabetes mellitus 01/01/2002    metformin 1000mg     Patient Active Problem List   Diagnosis    Personal history of allergy to penicillin    Disturbance of skin sensation    Dermatophytosis of nail    Nonspecific abnormal results of liver function study    Internal hemorrhoids    ETD (eustachian tube dysfunction)    Controlled type 2 diabetes mellitus without complication, without long-term current use of insulin (H)    Hyperlipidemia LDL goal <100    Hypertension goal BP (blood pressure) < 140/90    CTS (carpal tunnel syndrome)    JRA (juvenile rheumatoid arthritis) (H)    Colon adenoma    Shoulder injury, left, initial encounter     Current Outpatient Medications   Medication Sig Dispense Refill    ASPIRIN LOW DOSE 81 MG chewable tablet TAKE 1 TABLET (81 MG) BY MOUTH DAILY 30 tablet 0    atorvastatin (LIPITOR) 40 MG tablet Take 1 tablet (40 mg) by mouth daily. 90 tablet 1    empagliflozin (JARDIANCE) 25 MG TABS tablet Take 1 tablet (25 mg) by mouth daily. 90 tablet 1    ezetimibe (ZETIA) 10 MG tablet Take 1 tablet (10 mg) by mouth daily. 90 tablet 3    FIBERCON 625 MG OR TABS 1 tab q d 30 0    gabapentin (NEURONTIN) 300 MG capsule Take 1 capsule  (300 mg) by mouth at bedtime. 90 capsule 3    metFORMIN (GLUCOPHAGE XR) 500 MG 24 hr tablet TAKE 4 TABLETS(2000 MG) BY MOUTH DAILY WITH BREAKFAST 120 tablet 3    metoprolol tartrate (LOPRESSOR) 25 MG tablet Take 1 tablet (25 mg) by mouth 2 times daily. 180 tablet 1    naproxen (NAPROSYN) 500 MG tablet Take 1 tablet (500 mg) by mouth 2 times daily (with meals). 60 tablet 0    olmesartan (BENICAR) 20 MG tablet Take 1 tablet (20 mg) by mouth daily. 90 tablet 1     No current facility-administered medications for this visit.     Social History     Socioeconomic History    Marital status:      Spouse name: Jen Rick    Number of children: 3    Years of education: 14    Highest education level: Not on file   Occupational History    Occupation: [employer]     Employer: [employer]   Tobacco Use    Smoking status: Former     Current packs/day: 0.00     Types: Cigarettes     Quit date:      Years since quittin.5    Smokeless tobacco: Never   Vaping Use    Vaping status: Never Used   Substance and Sexual Activity    Alcohol use: Yes     Alcohol/week: 14.0 standard drinks of alcohol     Types: 14 Standard drinks or equivalent per week     Comment: Couple of shots every night    Drug use: Yes     Frequency: 7.0 times per week     Types: Marijuana     Comment: Once daily    Sexual activity: Yes     Partners: Female     Birth control/protection: Injection   Other Topics Concern     Service No    Blood Transfusions No    Caffeine Concern No    Occupational Exposure No    Hobby Hazards No    Sleep Concern No    Stress Concern No    Weight Concern No    Special Diet No    Back Care No    Exercise Yes     Comment: 2 times a week    Bike Helmet No    Seat Belt Yes    Self-Exams No    Parent/sibling w/ CABG, MI or angioplasty before 65F 55M? No   Social History Narrative    Social Documentation:        Balanced Diet: YES    Calcium intake: Less than 2 per day    Caffeine: 2 cups coffee per day     Exercise:  type of activity basketball and swim;  2 times per week    Sunscreen: No -     Seatbelts:  Yes    Self Breast Exam:  No - na    Self Testicular Exam: No - pt needs instruction.     Physical/Emotional/Sexual Abuse: No -     Do you feel safe in your environment? Yes        Cholesterol screen up to date: No - last lipid in 01/05, abnormal.     Eye Exam up to date: Yes    Dental Exam up to date: Yes    Pap smear up to date: Does Not Apply    Mammogram up to date: Does Not Apply    Dexa Scan up to date: Does Not Apply    Colonoscopy up to date: Does Not Apply    Immunizations up to date: No - last td 10 years ago.     Glucose screen if over 40:  No -              Social Drivers of Health     Financial Resource Strain: Low Risk  (2/11/2025)    Financial Resource Strain     Within the past 12 months, have you or your family members you live with been unable to get utilities (heat, electricity) when it was really needed?: No   Food Insecurity: Low Risk  (2/11/2025)    Food Insecurity     Within the past 12 months, did you worry that your food would run out before you got money to buy more?: No     Within the past 12 months, did the food you bought just not last and you didn t have money to get more?: No   Transportation Needs: Low Risk  (2/11/2025)    Transportation Needs     Within the past 12 months, has lack of transportation kept you from medical appointments, getting your medicines, non-medical meetings or appointments, work, or from getting things that you need?: No   Physical Activity: Not on file   Stress: Not on file   Social Connections: Not on file   Interpersonal Safety: Low Risk  (2/11/2025)    Interpersonal Safety     Do you feel physically and emotionally safe where you currently live?: Yes     Within the past 12 months, have you been hit, slapped, kicked or otherwise physically hurt by someone?: No     Within the past 12 months, have you been humiliated or emotionally abused in other ways by your  partner or ex-partner?: No   Housing Stability: Low Risk  (2/11/2025)    Housing Stability     Do you have housing? : Yes     Are you worried about losing your housing?: No     ROS  negative other than stated above    Exam:  GENERAL APPEARANCE: healthy, alert and no distress  EYES: EOMI,  PERRL  MS: extremities normal- no gross deformities noted, no evidence of inflammation in joints, FROM in all extremities.  SKIN: Right thumb around cuticle margin with large pus filled abscess.  Surrounding erythema that is very tender to the touch.  No streaking proximally.  NEURO: Normal strength and tone, sensory exam grossly normal, mentation intact and speech normal    assessment/plan:  (L03.011) Paronychia of right thumb  (primary encounter diagnosis)  Comment:   Plan: DRAIN SKIN ABSCESS SIMPLE/SINGLE,         sulfamethoxazole-trimethoprim (BACTRIM DS)         800-160 MG tablet        Large paronychia/abscess of the right thumb for the past 5 days.  Betadine swabs x 3.  11 blade was used to make incision around the cuticle margin.  Patient tolerated well.  Copious amounts of green purulent drainage was removed.  Finger was then soaked topical bacitracin and bandage was applied.  Will place on Bactrim antibiotic.  Continue with over-the-counter ibuprofen or Tylenol for pain.  Follow-up as needed

## 2025-07-06 NOTE — PROGRESS NOTES
Urgent Care Clinic Visit    Chief Complaint   Patient presents with    Urgent Care     Noticed swelling and pain on the right thumb, possible infected                7/6/2025     1:51 PM   Additional Questions   Roomed by Emily Webb   Accompanied by self

## 2025-07-09 ENCOUNTER — NURSE TRIAGE (OUTPATIENT)
Dept: FAMILY MEDICINE | Facility: CLINIC | Age: 60
End: 2025-07-09
Payer: COMMERCIAL

## 2025-07-09 NOTE — TELEPHONE ENCOUNTER
Nurse Triage SBAR    Situation: Pt calling stating that he is having a reaction to the abx he was prescribed at  on 7/6. (Bactrim)    Background: Pt was seen at  for concerns of infection on his right thumb and was prescribed Bactrim.     Assessment: Pt started to notice some itching, fever, malaise, and SOB on exertion on 7/7. Pt stated these symptoms continued on until he stopped taking the med (7/9). Pt has not taken medication since 7/8 at night. Pt is wanting another abx or to be assessed.     Protocol Recommended Disposition:   See in Office Within 3 Days, See More Appropriate Guideline    Recommendation: Pt was scheduled an appt tomorrow at [facility] for provider to assess reaction, thumb, and prescribe new abx if needed. Pt was advised to seek care more urgently if symptoms worsen. Pt was also advised to stop the abx until he is assessed by a provider. Pt agrees.       Reason for Disposition   [1] Widespread itching BUT [2] no rash   Patient wants to be seen    Additional Information   Negative: Life-threatening reaction in the past to similar substance (e.g., food, insect bite/sting, medication, etc.) and < 2 hours since exposure   Negative: Wheezing, stridor, hoarseness, or difficulty breathing   Negative: Tightness in the chest or throat and begins within 2 hours of exposure to allergic substance   Negative: Difficulty swallowing, drooling, or slurred speech   Negative: Difficult to awaken or acting confused (e.g., disoriented, slurred speech)   Negative: Unresponsive, passed out, or very weak   Negative: Other symptom of severe allergic reaction (Exception: Hives or facial swelling alone.)   Negative: Sounds like a life-threatening emergency to the triager   Negative: Widespread hives and onset > 2 hours after exposure to high-risk allergen (e.g., sting, nuts, 1st dose of antibiotic)   Negative: Widespread itching and onset > 2 hours after exposure to high-risk allergen (e.g., sting, nuts, 1st dose  "of antibiotic)   Negative: Face swelling and onset > 2 hours after exposure to high-risk allergen (e.g., sting, nuts, 1st dose of antibiotic)   Negative: Widespread hives, itching or facial swelling and onset < 2 hours of exposure to high-risk allergen (e.g., sting, nuts, 1st dose of antibiotic)   Negative: Vomiting or abdominal cramps and onset < 2 hours of exposure to high-risk allergen (e.g., sting, nuts, 1st dose of antibiotic)   Negative: Gave epinephrine shot and no symptoms now   Negative: Gave asthma inhaler or neb and no symptoms now   Negative: Patient wants to be seen   Negative: Took antihistamine (e.g., Benadryl) by mouth and no symptoms now   Negative: Athlete's foot suspected (e.g., itchy rash of feet, especially 3rd-4th web spaces)   Negative: Insect bites suspected   Negative: Jock itch suspected (e.g., itchy rash on inner thighs near genital area)   Negative: Poison ivy, oak, or sumac rash suspected (e.g., itchy rash after contact with poison ivy)   Negative: Pubic lice suspected (e.g., genital itching and lice or nits are seen)   Negative: Genital itching - female   Negative: Genital itching - male   Negative: Rectal (anus) itching   Negative: Localized rash also present   Negative: Patient sounds very sick or weak to the triager   Negative: Looks infected (e.g., spreading redness, red streak, pus)    Answer Assessment - Initial Assessment Questions  1. SYMPTOMS: \"What is the most serious symptom?\"      Shortness of breath   2. AIRWAY: \"Are they breathing?\" (e.g., Yes, No)  (R/O: airway blockage, respiratory arrest)       Yes   3. BREATHING: \"Is there difficulty breathing?\" (e.g., Yes, No; wheezing, unable to complete a sentence)  (R/O: respiratory distress)      Mild only on exertion   4. CIRCULATION: \"Are you feeling weak?\"  (e.g., Yes, No, Unknown; severity)  (R/O: shock) If Yes, ask: \"Can you stand and walk normally?\"      Can walk normally   5. SWALLOWING: \"Can you swallow?\" (e.g.,Yes, No; " "food, fluid, saliva)       yes  6. ONSET: \"When did the reaction start?\" (e.g., minutes, hours ago)       7/7  7. SUBSTANCE: \"What are you reacting to?\" \"When did the contact occur?\"       Antibiotic   8. PREVIOUS REACTION: \"Have you ever reacted to it before?\" If Yes, ask: \"What happened that time?\"      Denies   9. EPINEPHRINE: \"Do you have an epinephrine autoinjector (e.g., EpiPen)?\"      Denies    Answer Assessment - Initial Assessment Questions  1. DESCRIPTION: \"Describe the itching you are having.\" \"Where is it located?\"      Moderate   2. SEVERITY: \"How bad is it?\"       Moderate   3. SCRATCHING: \"Are there any scratch marks? Bleeding?\"      Denies   4. ONSET: \"When did the itching begin?\"       7/7  5. CAUSE: \"What do you think is causing the itching?\"       Antibiotic reaction   6. OTHER SYMPTOMS: \"Do you have any other symptoms?\"       Itching, SOB, fever, chills    Protocols used: Allergic Reactions - Guideline [id number], [id number], Itching - [id number]-HA    "

## 2025-07-10 ENCOUNTER — HOSPITAL ENCOUNTER (OUTPATIENT)
Facility: CLINIC | Age: 60
Setting detail: OBSERVATION
Discharge: HOME | End: 2025-07-11
Attending: EMERGENCY MEDICINE | Admitting: INTERNAL MEDICINE
Payer: COMMERCIAL

## 2025-07-10 ENCOUNTER — OFFICE VISIT (OUTPATIENT)
Dept: FAMILY MEDICINE | Facility: CLINIC | Age: 60
End: 2025-07-10
Payer: COMMERCIAL

## 2025-07-10 ENCOUNTER — TELEPHONE (OUTPATIENT)
Dept: FAMILY MEDICINE | Facility: CLINIC | Age: 60
End: 2025-07-10

## 2025-07-10 VITALS
WEIGHT: 227 LBS | DIASTOLIC BLOOD PRESSURE: 54 MMHG | TEMPERATURE: 98.1 F | OXYGEN SATURATION: 97 % | SYSTOLIC BLOOD PRESSURE: 82 MMHG | HEART RATE: 74 BPM | HEIGHT: 75 IN | BODY MASS INDEX: 28.23 KG/M2 | RESPIRATION RATE: 20 BRPM

## 2025-07-10 VITALS
DIASTOLIC BLOOD PRESSURE: 67 MMHG | RESPIRATION RATE: 20 BRPM | HEART RATE: 77 BPM | SYSTOLIC BLOOD PRESSURE: 101 MMHG | OXYGEN SATURATION: 97 %

## 2025-07-10 DIAGNOSIS — N17.9 AKI (ACUTE KIDNEY INJURY): ICD-10-CM

## 2025-07-10 DIAGNOSIS — R11.2 NAUSEA AND VOMITING, UNSPECIFIED VOMITING TYPE: ICD-10-CM

## 2025-07-10 DIAGNOSIS — I95.9 HYPOTENSION, UNSPECIFIED HYPOTENSION TYPE: ICD-10-CM

## 2025-07-10 DIAGNOSIS — E11.9 CONTROLLED TYPE 2 DIABETES MELLITUS WITHOUT COMPLICATION, WITHOUT LONG-TERM CURRENT USE OF INSULIN (H): ICD-10-CM

## 2025-07-10 DIAGNOSIS — E87.5 HYPERKALEMIA: ICD-10-CM

## 2025-07-10 DIAGNOSIS — R68.89 FLU-LIKE SYMPTOMS: Primary | ICD-10-CM

## 2025-07-10 LAB
ALBUMIN SERPL BCG-MCNC: 5 G/DL (ref 3.5–5.2)
ALBUMIN UR-MCNC: NEGATIVE MG/DL
ALP SERPL-CCNC: 65 U/L (ref 40–150)
ALT SERPL W P-5'-P-CCNC: 23 U/L (ref 0–70)
ANION GAP SERPL CALCULATED.3IONS-SCNC: 13 MMOL/L (ref 7–15)
APPEARANCE UR: CLEAR
AST SERPL W P-5'-P-CCNC: 73 U/L (ref 0–45)
BASOPHILS # BLD AUTO: 0 10E3/UL (ref 0–0.2)
BASOPHILS NFR BLD AUTO: 1 %
BILIRUB SERPL-MCNC: 0.3 MG/DL
BILIRUB UR QL STRIP: NEGATIVE
BUN SERPL-MCNC: 34.5 MG/DL (ref 6–20)
CALCIUM SERPL-MCNC: 9.7 MG/DL (ref 8.8–10.4)
CHLORIDE SERPL-SCNC: 100 MMOL/L (ref 98–107)
CK SERPL-CCNC: 216 U/L (ref 39–308)
COLOR UR AUTO: ABNORMAL
CREAT SERPL-MCNC: 2.12 MG/DL (ref 0.67–1.17)
EGFRCR SERPLBLD CKD-EPI 2021: 35 ML/MIN/1.73M2
EOSINOPHIL # BLD AUTO: 0 10E3/UL (ref 0–0.7)
EOSINOPHIL NFR BLD AUTO: 0 %
ERYTHROCYTE [DISTWIDTH] IN BLOOD BY AUTOMATED COUNT: 12.6 % (ref 10–15)
FLUAV RNA SPEC QL NAA+PROBE: NEGATIVE
FLUBV RNA RESP QL NAA+PROBE: NEGATIVE
GLUCOSE BLD-MCNC: 146 MG/DL (ref 60–99)
GLUCOSE BLDC GLUCOMTR-MCNC: 109 MG/DL (ref 70–99)
GLUCOSE BLDC GLUCOMTR-MCNC: 118 MG/DL (ref 70–99)
GLUCOSE BLDC GLUCOMTR-MCNC: 130 MG/DL (ref 70–99)
GLUCOSE BLDC GLUCOMTR-MCNC: 136 MG/DL (ref 70–99)
GLUCOSE BLDC GLUCOMTR-MCNC: 148 MG/DL (ref 70–99)
GLUCOSE BLDC GLUCOMTR-MCNC: 151 MG/DL (ref 70–99)
GLUCOSE BLDC GLUCOMTR-MCNC: 91 MG/DL (ref 70–99)
GLUCOSE SERPL-MCNC: 107 MG/DL (ref 70–99)
GLUCOSE UR STRIP-MCNC: 300 MG/DL
HCO3 BLDV-SCNC: 23 MMOL/L (ref 21–28)
HCO3 SERPL-SCNC: 21 MMOL/L (ref 22–29)
HCT VFR BLD AUTO: 41.5 % (ref 40–53)
HGB BLD-MCNC: 13.4 G/DL (ref 13.3–17.7)
HGB UR QL STRIP: NEGATIVE
HOLD SPECIMEN: NORMAL
HYALINE CASTS: 1 /LPF
IMM GRANULOCYTES # BLD: 0 10E3/UL
IMM GRANULOCYTES NFR BLD: 1 %
KETONES UR STRIP-MCNC: NEGATIVE MG/DL
LACTATE BLD-SCNC: 1.3 MMOL/L (ref 0.7–2)
LEUKOCYTE ESTERASE UR QL STRIP: NEGATIVE
LYMPHOCYTES # BLD AUTO: 1.9 10E3/UL (ref 0.8–5.3)
LYMPHOCYTES NFR BLD AUTO: 22 %
MAGNESIUM SERPL-MCNC: 1.8 MG/DL (ref 1.7–2.3)
MCH RBC QN AUTO: 28.9 PG (ref 26.5–33)
MCHC RBC AUTO-ENTMCNC: 32.3 G/DL (ref 31.5–36.5)
MCV RBC AUTO: 90 FL (ref 78–100)
MONOCYTES # BLD AUTO: 0.6 10E3/UL (ref 0–1.3)
MONOCYTES NFR BLD AUTO: 7 %
MUCOUS THREADS #/AREA URNS LPF: PRESENT /LPF
NEUTROPHILS # BLD AUTO: 6 10E3/UL (ref 1.6–8.3)
NEUTROPHILS NFR BLD AUTO: 70 %
NITRATE UR QL: NEGATIVE
NRBC # BLD AUTO: 0 10E3/UL
NRBC BLD AUTO-RTO: 0 /100
PCO2 BLDV: 47 MM HG (ref 40–50)
PH BLDV: 7.29 [PH] (ref 7.32–7.43)
PH UR STRIP: 5 [PH] (ref 5–7)
PLATELET # BLD AUTO: 301 10E3/UL (ref 150–450)
PO2 BLDV: 24 MM HG (ref 25–47)
POTASSIUM SERPL-SCNC: 4.2 MMOL/L (ref 3.4–5.3)
POTASSIUM SERPL-SCNC: 4.7 MMOL/L (ref 3.4–5.3)
POTASSIUM SERPL-SCNC: 6.5 MMOL/L (ref 3.4–5.3)
PROT SERPL-MCNC: 7.9 G/DL (ref 6.4–8.3)
RBC # BLD AUTO: 4.63 10E6/UL (ref 4.4–5.9)
RBC URINE: 0 /HPF
RSV RNA SPEC NAA+PROBE: NEGATIVE
SAO2 % BLDV: 35 % (ref 70–75)
SARS-COV-2 RNA RESP QL NAA+PROBE: NEGATIVE
SODIUM SERPL-SCNC: 134 MMOL/L (ref 135–145)
SP GR UR STRIP: 1.01 (ref 1–1.03)
SQUAMOUS EPITHELIAL: <1 /HPF
UROBILINOGEN UR STRIP-MCNC: NORMAL MG/DL
WBC # BLD AUTO: 8.5 10E3/UL (ref 4–11)
WBC URINE: 1 /HPF

## 2025-07-10 PROCEDURE — 84155 ASSAY OF PROTEIN SERUM: CPT | Performed by: EMERGENCY MEDICINE

## 2025-07-10 PROCEDURE — 99285 EMERGENCY DEPT VISIT HI MDM: CPT | Mod: 25

## 2025-07-10 PROCEDURE — 250N000009 HC RX 250: Performed by: EMERGENCY MEDICINE

## 2025-07-10 PROCEDURE — 82962 GLUCOSE BLOOD TEST: CPT

## 2025-07-10 PROCEDURE — 258N000001 HC RX 258: Performed by: EMERGENCY MEDICINE

## 2025-07-10 PROCEDURE — 36415 COLL VENOUS BLD VENIPUNCTURE: CPT | Performed by: INTERNAL MEDICINE

## 2025-07-10 PROCEDURE — 99222 1ST HOSP IP/OBS MODERATE 55: CPT | Performed by: INTERNAL MEDICINE

## 2025-07-10 PROCEDURE — 250N000013 HC RX MED GY IP 250 OP 250 PS 637: Performed by: INTERNAL MEDICINE

## 2025-07-10 PROCEDURE — 96375 TX/PRO/DX INJ NEW DRUG ADDON: CPT

## 2025-07-10 PROCEDURE — 99285 EMERGENCY DEPT VISIT HI MDM: CPT | Mod: 25 | Performed by: EMERGENCY MEDICINE

## 2025-07-10 PROCEDURE — 96361 HYDRATE IV INFUSION ADD-ON: CPT

## 2025-07-10 PROCEDURE — 250N000012 HC RX MED GY IP 250 OP 636 PS 637: Performed by: EMERGENCY MEDICINE

## 2025-07-10 PROCEDURE — 85004 AUTOMATED DIFF WBC COUNT: CPT | Performed by: EMERGENCY MEDICINE

## 2025-07-10 PROCEDURE — 258N000003 HC RX IP 258 OP 636: Performed by: EMERGENCY MEDICINE

## 2025-07-10 PROCEDURE — 87637 SARSCOV2&INF A&B&RSV AMP PRB: CPT | Performed by: EMERGENCY MEDICINE

## 2025-07-10 PROCEDURE — 96374 THER/PROPH/DIAG INJ IV PUSH: CPT

## 2025-07-10 PROCEDURE — 120N000001 HC R&B MED SURG/OB

## 2025-07-10 PROCEDURE — 94640 AIRWAY INHALATION TREATMENT: CPT

## 2025-07-10 PROCEDURE — 84132 ASSAY OF SERUM POTASSIUM: CPT | Performed by: INTERNAL MEDICINE

## 2025-07-10 PROCEDURE — 93005 ELECTROCARDIOGRAM TRACING: CPT

## 2025-07-10 PROCEDURE — 82550 ASSAY OF CK (CPK): CPT | Performed by: EMERGENCY MEDICINE

## 2025-07-10 PROCEDURE — 83735 ASSAY OF MAGNESIUM: CPT | Performed by: EMERGENCY MEDICINE

## 2025-07-10 PROCEDURE — 83605 ASSAY OF LACTIC ACID: CPT

## 2025-07-10 PROCEDURE — 84132 ASSAY OF SERUM POTASSIUM: CPT | Performed by: EMERGENCY MEDICINE

## 2025-07-10 PROCEDURE — 258N000003 HC RX IP 258 OP 636: Performed by: INTERNAL MEDICINE

## 2025-07-10 PROCEDURE — 81001 URINALYSIS AUTO W/SCOPE: CPT | Performed by: INTERNAL MEDICINE

## 2025-07-10 PROCEDURE — 250N000011 HC RX IP 250 OP 636: Performed by: EMERGENCY MEDICINE

## 2025-07-10 PROCEDURE — 36415 COLL VENOUS BLD VENIPUNCTURE: CPT | Performed by: EMERGENCY MEDICINE

## 2025-07-10 PROCEDURE — 250N000013 HC RX MED GY IP 250 OP 250 PS 637: Performed by: EMERGENCY MEDICINE

## 2025-07-10 RX ORDER — CALCIUM CARBONATE 500 MG/1
1000 TABLET, CHEWABLE ORAL 4 TIMES DAILY PRN
Status: DISCONTINUED | OUTPATIENT
Start: 2025-07-10 | End: 2025-07-11 | Stop reason: HOSPADM

## 2025-07-10 RX ORDER — AMOXICILLIN 250 MG
2 CAPSULE ORAL 2 TIMES DAILY PRN
Status: DISCONTINUED | OUTPATIENT
Start: 2025-07-10 | End: 2025-07-11 | Stop reason: HOSPADM

## 2025-07-10 RX ORDER — LIDOCAINE 40 MG/G
CREAM TOPICAL
Status: DISCONTINUED | OUTPATIENT
Start: 2025-07-10 | End: 2025-07-11 | Stop reason: HOSPADM

## 2025-07-10 RX ORDER — EZETIMIBE 10 MG/1
10 TABLET ORAL DAILY
Status: DISCONTINUED | OUTPATIENT
Start: 2025-07-11 | End: 2025-07-11 | Stop reason: HOSPADM

## 2025-07-10 RX ORDER — METOPROLOL TARTRATE 25 MG/1
25 TABLET, FILM COATED ORAL 2 TIMES DAILY
Status: DISCONTINUED | OUTPATIENT
Start: 2025-07-10 | End: 2025-07-11 | Stop reason: HOSPADM

## 2025-07-10 RX ORDER — CALCIUM GLUCONATE 20 MG/ML
1 INJECTION, SOLUTION INTRAVENOUS ONCE
Status: COMPLETED | OUTPATIENT
Start: 2025-07-10 | End: 2025-07-10

## 2025-07-10 RX ORDER — ONDANSETRON 4 MG/1
4 TABLET, ORALLY DISINTEGRATING ORAL EVERY 6 HOURS PRN
Status: DISCONTINUED | OUTPATIENT
Start: 2025-07-10 | End: 2025-07-11 | Stop reason: HOSPADM

## 2025-07-10 RX ORDER — FUROSEMIDE 10 MG/ML
40 INJECTION INTRAMUSCULAR; INTRAVENOUS ONCE
Status: COMPLETED | OUTPATIENT
Start: 2025-07-10 | End: 2025-07-10

## 2025-07-10 RX ORDER — ONDANSETRON 2 MG/ML
4 INJECTION INTRAMUSCULAR; INTRAVENOUS EVERY 6 HOURS PRN
Status: DISCONTINUED | OUTPATIENT
Start: 2025-07-10 | End: 2025-07-11 | Stop reason: HOSPADM

## 2025-07-10 RX ORDER — AMOXICILLIN 250 MG
1 CAPSULE ORAL 2 TIMES DAILY PRN
Status: DISCONTINUED | OUTPATIENT
Start: 2025-07-10 | End: 2025-07-11 | Stop reason: HOSPADM

## 2025-07-10 RX ORDER — DEXTROSE MONOHYDRATE 25 G/50ML
25 INJECTION, SOLUTION INTRAVENOUS ONCE
Status: COMPLETED | OUTPATIENT
Start: 2025-07-10 | End: 2025-07-10

## 2025-07-10 RX ORDER — PROCHLORPERAZINE MALEATE 5 MG/1
10 TABLET ORAL EVERY 6 HOURS PRN
Status: DISCONTINUED | OUTPATIENT
Start: 2025-07-10 | End: 2025-07-11 | Stop reason: HOSPADM

## 2025-07-10 RX ORDER — ACETAMINOPHEN 500 MG
1000 TABLET ORAL ONCE
Status: COMPLETED | OUTPATIENT
Start: 2025-07-10 | End: 2025-07-10

## 2025-07-10 RX ORDER — ACETAMINOPHEN 325 MG/1
650 TABLET ORAL EVERY 4 HOURS PRN
Status: DISCONTINUED | OUTPATIENT
Start: 2025-07-10 | End: 2025-07-11 | Stop reason: HOSPADM

## 2025-07-10 RX ORDER — ASPIRIN 81 MG/1
81 TABLET ORAL EVERY EVENING
Status: DISCONTINUED | OUTPATIENT
Start: 2025-07-10 | End: 2025-07-11 | Stop reason: HOSPADM

## 2025-07-10 RX ORDER — ONDANSETRON 2 MG/ML
4 INJECTION INTRAMUSCULAR; INTRAVENOUS ONCE
Status: COMPLETED | OUTPATIENT
Start: 2025-07-10 | End: 2025-07-10

## 2025-07-10 RX ORDER — ACETAMINOPHEN 650 MG/1
650 SUPPOSITORY RECTAL EVERY 4 HOURS PRN
Status: DISCONTINUED | OUTPATIENT
Start: 2025-07-10 | End: 2025-07-11 | Stop reason: HOSPADM

## 2025-07-10 RX ORDER — ALBUTEROL SULFATE 5 MG/ML
10 SOLUTION RESPIRATORY (INHALATION) ONCE
Status: COMPLETED | OUTPATIENT
Start: 2025-07-10 | End: 2025-07-10

## 2025-07-10 RX ORDER — NICOTINE POLACRILEX 4 MG
15-30 LOZENGE BUCCAL
Status: DISCONTINUED | OUTPATIENT
Start: 2025-07-10 | End: 2025-07-11 | Stop reason: HOSPADM

## 2025-07-10 RX ORDER — GABAPENTIN 300 MG/1
300 CAPSULE ORAL AT BEDTIME
Status: DISCONTINUED | OUTPATIENT
Start: 2025-07-10 | End: 2025-07-11 | Stop reason: HOSPADM

## 2025-07-10 RX ORDER — DEXTROSE MONOHYDRATE 25 G/50ML
25-50 INJECTION, SOLUTION INTRAVENOUS
Status: DISCONTINUED | OUTPATIENT
Start: 2025-07-10 | End: 2025-07-11 | Stop reason: HOSPADM

## 2025-07-10 RX ORDER — ATORVASTATIN CALCIUM 40 MG/1
40 TABLET, FILM COATED ORAL DAILY
Status: DISCONTINUED | OUTPATIENT
Start: 2025-07-11 | End: 2025-07-11 | Stop reason: HOSPADM

## 2025-07-10 RX ADMIN — CALCIUM GLUCONATE 1 G: 20 INJECTION, SOLUTION INTRAVENOUS at 16:54

## 2025-07-10 RX ADMIN — SODIUM CHLORIDE 1000 ML: 9 INJECTION, SOLUTION INTRAVENOUS at 15:34

## 2025-07-10 RX ADMIN — ONDANSETRON 4 MG: 2 INJECTION, SOLUTION INTRAMUSCULAR; INTRAVENOUS at 15:29

## 2025-07-10 RX ADMIN — GABAPENTIN 300 MG: 300 CAPSULE ORAL at 20:46

## 2025-07-10 RX ADMIN — SODIUM CHLORIDE 1000 ML: 0.9 INJECTION, SOLUTION INTRAVENOUS at 19:08

## 2025-07-10 RX ADMIN — FUROSEMIDE 40 MG: 10 INJECTION, SOLUTION INTRAMUSCULAR; INTRAVENOUS at 17:05

## 2025-07-10 RX ADMIN — DEXTROSE MONOHYDRATE 25 G: 25 INJECTION, SOLUTION INTRAVENOUS at 16:58

## 2025-07-10 RX ADMIN — METOPROLOL TARTRATE 25 MG: 25 TABLET, FILM COATED ORAL at 20:46

## 2025-07-10 RX ADMIN — SODIUM CHLORIDE 10 UNITS: 9 INJECTION, SOLUTION INTRAVENOUS at 16:55

## 2025-07-10 RX ADMIN — ASPIRIN 81 MG: 81 TABLET, DELAYED RELEASE ORAL at 20:46

## 2025-07-10 RX ADMIN — ACETAMINOPHEN 1000 MG: 500 TABLET, FILM COATED ORAL at 15:29

## 2025-07-10 RX ADMIN — ALBUTEROL SULFATE 10 MG: 2.5 SOLUTION RESPIRATORY (INHALATION) at 17:01

## 2025-07-10 ASSESSMENT — ACTIVITIES OF DAILY LIVING (ADL)
FALL_HISTORY_WITHIN_LAST_SIX_MONTHS: YES
ADLS_ACUITY_SCORE: 22
ADLS_ACUITY_SCORE: 45
ADLS_ACUITY_SCORE: 41
ADLS_ACUITY_SCORE: 22
NUMBER_OF_TIMES_PATIENT_HAS_FALLEN_WITHIN_LAST_SIX_MONTHS: 1
ADLS_ACUITY_SCORE: 22
ADLS_ACUITY_SCORE: 45

## 2025-07-10 ASSESSMENT — ENCOUNTER SYMPTOMS
SHORTNESS OF BREATH: 1
HEADACHES: 1
HEMATURIA: 0
CHILLS: 1
PALPITATIONS: 0
ABDOMINAL PAIN: 0
VOMITING: 1
COUGH: 1
RHINORRHEA: 1
DYSURIA: 1
FREQUENCY: 0
DIAPHORESIS: 1
FATIGUE: 1
FEVER: 1
NAUSEA: 1
DIZZINESS: 1
BACK PAIN: 1
WHEEZING: 1
SORE THROAT: 0

## 2025-07-10 ASSESSMENT — COLUMBIA-SUICIDE SEVERITY RATING SCALE - C-SSRS
1. IN THE PAST MONTH, HAVE YOU WISHED YOU WERE DEAD OR WISHED YOU COULD GO TO SLEEP AND NOT WAKE UP?: NO
2. HAVE YOU ACTUALLY HAD ANY THOUGHTS OF KILLING YOURSELF IN THE PAST MONTH?: NO
6. HAVE YOU EVER DONE ANYTHING, STARTED TO DO ANYTHING, OR PREPARED TO DO ANYTHING TO END YOUR LIFE?: NO

## 2025-07-10 ASSESSMENT — PAIN SCALES - GENERAL: PAINLEVEL_OUTOF10: MODERATE PAIN (6)

## 2025-07-10 NOTE — TELEPHONE ENCOUNTER
Rapid Response Event Note       S-(situation): Pt at clinic for appt with Holli DELUCA for possible allergic reaction to antibiotic - symptoms of fever, cough, nausea, SOB.  Manual BP in 80's, pt placed in supine position, Holli called RRT.    B-(background): Flu like symptoms started before 7/4/25.  UC on 7/6/25 for finger infection.  Started Bactrim, symptoms worsened.  Triaged on 7/9/25, thought may be having reaction to Bactrim.  Stopped med and scheduled appt for today with Holli.    History of high BP, Type 2 Diabetes, heart attack/stents placed in 2021.    A-(assessment): Cough, dizziness, SOB, diarrhea, blurry vision.  Diarrhea started on 7/8/25.  Emesis x 1 at clinic.     Blood glucose 146 at 1:43 PM.      Pt took daily Metoprolol and Losartan this AM.    R-(recommendations/interventions): 911 called for transport to ED.       Event Times    RRT initiated at: 1:41 PM  Event location: [facility] clinic exam room  911 called at: 1:43 PM  EMS arrival:  2:01 PM  Patient transferred to [facility] ED at 2:10 via EMS in stable condition   Family/caregiver notified (yes/no): no    Vital Signs/Labs/EKG    (RN added VS to flowsheet but unable to pull into note)    Afebrile in rooming VS.    1:41 PM - BP 94/58    1:45 PM - /68, HR 78, RR 18, SpO2 99% RA    1:51 PM - /67, HR 74, RR 20, SpO2 98% RA    1:56 PM - /67, HR 77, RR 20, SpO2 97% RA    Glucose:146 at 1:43 PM      Licensed Practitioner     I have reviewed the RN documentation and agree with plan.    Holli Ceja, CNP  Family Medicine Nurse Practitioner

## 2025-07-10 NOTE — PROGRESS NOTES
"  Assessment & Plan     Flu-like symptoms  Nausea and vomiting, unspecified vomiting type  Hypotension, unspecified hypotension type  Controlled type 2 diabetes mellitus without complication, without long-term current use of insulin (H)  Ezequiel is ill with a constellation of symptoms. Notably hypotensive on my manual BP check today and vomited today during rooming process. With current symptoms and low BP, recommend ED for evaluation. Due to symptomatic hypotension with dizziness, EMS called for transport. RRT was called in clinic while awaiting EMS. Patient transported to [facility] ED.   - Glucose, whole blood; Future      BMI  Estimated body mass index is 28.37 kg/m  as calculated from the following:    Height as of this encounter: 1.905 m (6' 3\").    Weight as of this encounter: 103 kg (227 lb).     F/U after ED visit.     Subjective   Ezequiel is a 60 year old, presenting for the following health issues:  Back Pain, Headache, and Sick        7/10/2025     1:03 PM   Additional Questions   Roomed by Sheba Snyder VF     Via the Health Maintenance questionnaire, the patient has reported the following services have been completed -Eye Exam: [employer] 2025-06-09, this information has been sent to the abstraction team.  Back Pain   Associated symptoms include a fever, headaches and dysuria. Pertinent negatives include no chest pain and no abdominal pain.   Headache   Associated symptoms include a fever, shortness of breath, nausea and vomiting. Pertinent negatives include no palpitations.   History of Present Illness       Back Pain:  He presents for follow up of back pain. Patient's back pain is a new problem.    Original cause of back pain: not sure  First noticed back pain: today  Patient feels back pain: comes and goesLocation of back pain:  Right middle of back  Description of back pain: cramping  Back pain spreads: right side of neck    Since patient first noticed back pain, pain is: always present, but gets better and " "worse  Does back pain interfere with his job:  Yes  On a scale of 1-10 (10 being the worst), patient describes pain as:  6  What makes back pain worse: certain positions   Acupuncture: not tried  Acetaminophen: not tried  Activity or exercise: not tried  Chiropractor:  Not tried  Cold: not helpful  Heat: not tried  Massage: not tried  Muscle relaxants: not tried  NSAIDS: not tried  Opioids: not tried  Physical Therapy: not tried  Rest: helpful  Steroid Injection: not tried  Stretching: not helpful  Surgery: not tried  TENS unit: not tried  Topical pain relievers: not tried  Other healthcare providers patient is seeing for back pain: None    Headaches:   Since the patient's last clinic visit, headaches are: improved  The patient is getting headaches:  Standing  He is not able to do normal daily activities when he has a migraine.  The patient is taking the following rescue/relief medications:  Tylenol   Patient states \"I get some relief\" from the rescue/relief medications.   The patient is taking the following medications to prevent migraines:  No medications to prevent migraines  In the past 4 weeks, the patient has gone to an Urgent Care or Emergency Room 1 time times due to headaches. He is missing 1 dose(s) of medications per week.  He is not taking prescribed medications regularly due to remembering to take.    Acute Illness  Acute illness concerns: Sick  Onset/Duration: 6/29  Symptoms:  Fever: YES subjective  Chills/Sweats: YES  Headache (location?): YES  Sinus Pressure: No  Conjunctivitis:  No  Ear Pain: no  Rhinorrhea: Yes  Congestion: Yes  Sore Throat: No  Cough: YES-non-productive  Wheeze: YES- occasional, more when sleeping at night.   Decreased Appetite: YES  Nausea: YES  Vomiting: Yes- vomited for first time in exam room today.   Diarrhea: YES- Monday/Tuesday but resolved  Dysuria/Freq.: YES- dysuria. Wasn't urinating well Monday/Tuesday. Urinating well since then but feels slight pain.   Dysuria or " "Hematuria: No  Fatigue/Achiness: YES  Sick/Strep Exposure: No  Therapies tried and outcome: None    Started with flu-like symptoms the last weekend of June. Started to feel like he was improving but hadn't recovered by the time he went to urgent care 7/6 with paronychia of right thumb.  This was drained and he was placed on Bactrim. He noticed itching, fever, malaise, and SOB on exertion 7/7. He thought he was possibly having a reaction to Bactrim so stopped this and spoke to triage nurse. Scheduled for today's visit.   Reports he thought maybe he was doing a little better but then again today feels sick again.  Felt dizzy and blurry vision on the drive over today.    Review of Systems   Constitutional:  Positive for chills, diaphoresis, fatigue and fever.   HENT:  Positive for congestion and rhinorrhea. Negative for sore throat.    Respiratory:  Positive for cough, shortness of breath and wheezing.    Cardiovascular:  Negative for chest pain and palpitations.   Gastrointestinal:  Positive for nausea and vomiting. Negative for abdominal pain.   Genitourinary:  Positive for dysuria. Negative for frequency and hematuria.   Musculoskeletal:  Positive for back pain.   Skin:  Negative for rash.   Neurological:  Positive for dizziness and headaches.       Objective    BP (!) 82/54   Pulse 74   Temp 98.1  F (36.7  C) (Oral)   Resp 20   Ht 1.905 m (6' 3\")   Wt 103 kg (227 lb)   SpO2 97%   BMI 28.37 kg/m    Body mass index is 28.37 kg/m .  Provider manual BP check 82/54  BP Readings from Last 6 Encounters:   07/10/25 94/64   07/06/25 112/74   05/15/25 125/81   05/13/25 123/76   03/18/25 128/81   02/11/25 (!) 158/99       Physical Exam  Vitals and nursing note reviewed.   Constitutional:       Appearance: He is ill-appearing.   HENT:      Head: Normocephalic.      Nose: Nose normal.      Mouth/Throat:      Mouth: Mucous membranes are moist.      Pharynx: Oropharynx is clear.   Eyes:      Conjunctiva/sclera: " Conjunctivae normal.      Pupils: Pupils are equal, round, and reactive to light.   Cardiovascular:      Rate and Rhythm: Normal rate and regular rhythm.      Heart sounds: Normal heart sounds.   Pulmonary:      Effort: Pulmonary effort is normal.      Breath sounds: Normal breath sounds.   Musculoskeletal:         General: Normal range of motion.   Skin:     General: Skin is warm and dry.   Neurological:      General: No focal deficit present.      Mental Status: He is alert and oriented to person, place, and time.   Psychiatric:         Mood and Affect: Mood normal.         Behavior: Behavior normal.          Recent Results (from the past 24 hours)   Glucose, whole blood   Result Value Ref Range    Glucose Whole Blood 146 (H) 60 - 99 mg/dL         I spent a total of 49 minutes on the day of the visit.   Time spent by me today doing chart review, history and exam, documentation and further activities per the note    Signed Electronically by: SHANELLE Spear CNP

## 2025-07-10 NOTE — ED PROVIDER NOTES
Emergency Department Note      History of Present Illness     Chief Complaint   Nausea, Vomiting, & Diarrhea      HPI   Dimitry Rick Sr. is a 60 year old male with a history of type 2 diabetes mellitus, hypertension, hyperlipidemia, MI and CAD presenting with nausea, vomiting and diarrhea. The patient reports going to urgent care on Sunday for an infection to his right thumb.  Paronychia of the right thumb was drained.  Patient was discharged home on Bactrim; he started it on Sunday morning but stopped it by Tuesday morning due to becoming symptomatic with nausea, vomiting, diarrhea. He had been doing better up until the 4th of July.  Dimitry describes hot/cold flashes, chills, body aches, coughing, nausea and decreased appetite. He has also notes nausea and one emesis. He has not had tylenol of ibuprofen for symptom management. He states he was around kids who were coughing on the 4th of July. Patient denies sore throat, recent emesis or other symptoms.       Independent Historian   None    Review of External Notes   Reviewed patient's office visit on 7/6/2025, patient was diagnosed with a large paronychia on the right thumb, this was drained and the patient was started on Bactrim.    Reviewed patient's office visit today with family medicine, patient was hypotensive, diaphoretic.  Advised to go to the emergency department.    Past Medical History     Medical History and Problem List   Essential hypertension, benign  Hyperlipidemia  Juvenile rheumatoid arthritis  Type 2 diabetes mellitus  HYUN  CAD  Hepatic steatosis  Hyperkalemia  MI  ANA MARIA  Renal cyst     Medications   Aspirin 81 mg   Atorvastatin   Empagliflozin   Ezetimibe  Fibercon   Gabapentin   Metformin   Metoprolol tartrate   Naproxen   Olmesartan   Lisinopril     Surgical History   Left heart Cath     Physical Exam     Patient Vitals for the past 24 hrs:   BP Temp Temp src Pulse Resp SpO2   07/10/25 1705 131/56 -- -- 75 19 99 %   07/10/25 1703 124/66  -- -- 73 14 98 %   07/10/25 1630 (!) 125/91 -- -- 64 19 98 %   07/10/25 1600 116/70 -- -- 62 19 97 %   07/10/25 1532 119/69 -- -- 64 19 100 %   07/10/25 1447 113/64 97.4  F (36.3  C) Oral 74 18 97 %     Physical Exam  General: Well-nourished, resting comfortably when I enter the room  Eyes: Pupils equal, conjunctivae pink no scleral icterus or conjunctival injection  ENT:  Moist mucus membranes  Respiratory:  Lungs clear to auscultation bilaterally, no crackles/rubs/wheezes.  Good air movement  CV: Normal rate and rhythm, no murmurs  GI:  Abdomen soft and non-distended.  No tenderness, guarding or rebound  Skin: Warm, dry.  No rashes or petechiae.  Paronychia on the right thumb has almost completely resolved, no fluctuance, induration, redness, tenderness to palpation, or swelling.  Musculoskeletal: No peripheral edema or calf tenderness  Neuro: Alert and oriented to person/place/time  Psychiatric: Normal affect      Diagnostics     Lab Results   Labs Ordered and Resulted from Time of ED Arrival to Time of ED Departure   COMPREHENSIVE METABOLIC PANEL - Abnormal       Result Value    Sodium 134 (*)     Potassium 6.5 (*)     Carbon Dioxide (CO2) 21 (*)     Anion Gap 13      Urea Nitrogen 34.5 (*)     Creatinine 2.12 (*)     GFR Estimate 35 (*)     Calcium 9.7      Chloride 100      Glucose 107 (*)     Alkaline Phosphatase 65      AST 73 (*)     ALT 23      Protein Total 7.9      Albumin 5.0      Bilirubin Total 0.3     ISTAT GASES LACTATE VENOUS POCT - Abnormal    Lactic Acid POCT 1.3      Bicarbonate Venous POCT 23      O2 Sat, Venous POCT 35 (*)     pCO2 Venous POCT 47      pH Venous POCT 7.29 (*)     pO2 Venous POCT 24 (*)    GLUCOSE BY METER - Abnormal    GLUCOSE BY METER POCT 151 (*)    INFLUENZA A/B, RSV AND SARS-COV2 PCR - Normal    Influenza A PCR Negative      Influenza B PCR Negative      RSV PCR Negative      SARS CoV2 PCR Negative     CK TOTAL - Normal         MAGNESIUM - Normal    Magnesium 1.8      GLUCOSE BY METER - Normal    GLUCOSE BY METER POCT 91     CBC WITH PLATELETS AND DIFFERENTIAL    WBC Count 8.5      RBC Count 4.63      Hemoglobin 13.4      Hematocrit 41.5      MCV 90      MCH 28.9      MCHC 32.3      RDW 12.6      Platelet Count 301      % Neutrophils 70      % Lymphocytes 22      % Monocytes 7      % Eosinophils 0      % Basophils 1      % Immature Granulocytes 1      NRBCs per 100 WBC 0      Absolute Neutrophils 6.0      Absolute Lymphocytes 1.9      Absolute Monocytes 0.6      Absolute Eosinophils 0.0      Absolute Basophils 0.0      Absolute Immature Granulocytes 0.0      Absolute NRBCs 0.0     GLUCOSE MONITOR NURSING POCT   GLUCOSE MONITOR NURSING POCT   POTASSIUM   POTASSIUM       Imaging   No orders to display       EKG   ECG taken at 1640, ECG read at 1651  Sinus rhythm with sinus arrhythmia   Normal ECG   No significant change as compared to prior, dated 07/1/020.  Rate 69 bpm. AK interval 164 ms. QRS duration 96 ms. QT/QTc 372/398 ms. P-R-T axes 60 67 37.    Independent Interpretation   None    ED Course      Medications Administered   Medications   glucose gel 15-30 g (has no administration in time range)     Or   dextrose 50 % injection 25-50 mL (has no administration in time range)     Or   glucagon injection 1 mg (has no administration in time range)   sodium chloride 0.9% BOLUS 1,000 mL (1,000 mLs Intravenous $New Bag 7/10/25 1534)   acetaminophen (TYLENOL) tablet 1,000 mg (1,000 mg Oral $Given 7/10/25 1529)   ondansetron (ZOFRAN) injection 4 mg (4 mg Intravenous $Given 7/10/25 1529)   dextrose 50 % injection 25 g (25 g Intravenous $Given 7/10/25 1658)   insulin regular 1 unit/mL injection 10 Units (10 Units Intravenous $Given 7/10/25 1655)   furosemide (LASIX) injection 40 mg (40 mg Intravenous $Given 7/10/25 1705)   albuterol (PROVENTIL) neb solution 10 mg (10 mg Nebulization $Given 7/10/25 1701)   calcium gluconate 1 g in 50 mL in sodium chloride intermittent infusion (1 g  Intravenous $Given 7/10/25 1654)       Procedures   Procedures     Discussion of Management   Admitting Hospitalist, Dr. Alvarez    ED Course   ED Course as of 07/10/25 1738   Thu Jul 10, 2025   4868 I obtained the history and examined the patient as noted above.      1643 I rechecked the patient and explained findings.    1711 I spoke with Dr. Alvarez of the hospitalist team who accepted the patient for admission.         Additional Documentation  None    Medical Decision Making / Diagnosis     CMS Diagnoses: None    MIPS   None     MDM   Dimitry Rick Sr. is a 60 year old male with history of diabetes, hypertension, hyperlipidemia, coronary artery disease presents to the emergency department with a complaint of flulike symptoms.  He was also found to be hypotensive at his primary care physician office today.  On exam, patient is awake, alert, answering questions appropriately.  Patient's vital signs are all within acceptable limits, the patient is not hypotensive or tachycardic.  He is also not febrile.  His right thumb is looking much improved from his report.  There is no fluctuance, induration, redness, swelling, pain with palpation.  It looks like the paronychia has almost completely resolved.  There is no signs of flexor tenosynovitis or continued abscess.  I do not think that he needs to continue Bactrim.   Workup reveals that the patient is hyperkalemic with an HYUN.  EKG shows peaked T waves.  Patient is given calcium, Lasix with a liter of fluid, insulin and dextrose, and albuterol.  Patient's bicarb is at an appropriate level and he is not severely acidotic so we will hold off on bicarb at this time.  I expect that his kidney injury and hyperkalemia is secondary to Bactrim use as well as the vomiting.  I did speak with Dr. Alvarez with hospitalist service, and the patient is admitted.      Disposition   The patient was admitted to the hospital.     Diagnosis     ICD-10-CM    1. Hyperkalemia  E87.5        2. HYUN (acute kidney injury)  N17.9       3. Nausea and vomiting, unspecified vomiting type  R11.2              Scribe Disclosure:  I, Diane Marin, am serving as a scribe at 3:19 PM on 7/10/2025 to document services personally performed by Dary Chavira MD based on my observations and the provider's statements to me.        Dary Chavira MD  07/10/25 8710

## 2025-07-10 NOTE — PHARMACY-ADMISSION MEDICATION HISTORY
Pharmacy Intern Admission Medication History    Admission medication history is complete. The information provided in this note is only as accurate as the sources available at the time of the update.    Information Source(s): Patient and CareEverywhere/SureScripts via in-person    Pertinent Information: Patient started a 7 day course of Bactrim DS on 7/6/25 but stopped it early on 7/8/25 after taking 1 dose that morning.     Changes made to PTA medication list:  Added: None  Deleted: Fibercon, Naproxen  Changed: None    Allergies reviewed with patient and updates made in EHR: yes    Medication History Completed By: Srinath Cerrato 7/10/2025 6:25 PM    PTA Med List   Medication Sig Last Dose/Taking    ASPIRIN LOW DOSE 81 MG chewable tablet TAKE 1 TABLET (81 MG) BY MOUTH DAILY 7/9/2025 Evening    atorvastatin (LIPITOR) 40 MG tablet Take 1 tablet (40 mg) by mouth daily. 7/10/2025 Morning    empagliflozin (JARDIANCE) 25 MG TABS tablet Take 1 tablet (25 mg) by mouth daily. 7/10/2025 Morning    ezetimibe (ZETIA) 10 MG tablet Take 1 tablet (10 mg) by mouth daily. 7/10/2025 Morning    gabapentin (NEURONTIN) 300 MG capsule Take 1 capsule (300 mg) by mouth at bedtime. 7/9/2025 Bedtime    metFORMIN (GLUCOPHAGE XR) 500 MG 24 hr tablet TAKE 4 TABLETS(2000 MG) BY MOUTH DAILY WITH BREAKFAST 7/10/2025 Morning    metoprolol tartrate (LOPRESSOR) 25 MG tablet Take 1 tablet (25 mg) by mouth 2 times daily. 7/10/2025 Morning    olmesartan (BENICAR) 20 MG tablet Take 1 tablet (20 mg) by mouth daily. 7/9/2025 Evening

## 2025-07-10 NOTE — ED TRIAGE NOTES
Pt comes via EMS from [facility], states he was started on Bactrim Sunday for a thumb infection and then began having n/v/d along with chills. States he stopped antibiotic Tuesday morning and not started on anything. C/o headache and fatigue. EMS gave 250ml NS en route. ABC's intact, alert and oriented X3.     Triage Assessment (Adult)       Row Name 07/10/25 1445          Triage Assessment    Airway WDL WDL        Respiratory WDL    Respiratory WDL WDL        Skin Circulation/Temperature WDL    Skin Circulation/Temperature WDL WDL        Cardiac WDL    Cardiac WDL WDL        Peripheral/Neurovascular WDL    Peripheral Neurovascular WDL WDL        Cognitive/Neuro/Behavioral WDL    Cognitive/Neuro/Behavioral WDL WDL

## 2025-07-10 NOTE — ED NOTES
[facility]  ED Nurse Handoff Report    ED Chief complaint: Nausea, Vomiting, & Diarrhea  . ED Diagnosis:   Final diagnoses:   Hyperkalemia   HYUN (acute kidney injury)   Nausea and vomiting, unspecified vomiting type       Allergies:   Allergies   Allergen Reactions    Penicillins      joint aches       Code Status: Full Code    Activity level - Baseline/Home:  independent.  Activity Level - Current:   independent.   Lift room needed: No.   Bariatric: No   Needed: No   Isolation: No.   Infection: Not Applicable.     Respiratory status: Room air    Vital Signs (within 30 minutes):   Vitals:    07/10/25 1532 07/10/25 1600 07/10/25 1630 07/10/25 1705   BP: 119/69 116/70 (!) 125/91 131/56   Pulse: 64 62 64 75   Resp: 19 19 19 19   Temp:       TempSrc:       SpO2: 100% 97% 98% 99%       Cardiac Rhythm:  ,      Pain level:    Patient confused: No.   Patient Falls Risk: patient and family education.   Elimination Status: needs to void     Patient Report - Initial Complaint: Headache for a couple of days.   Focused Assessment: Headache remains, very pleasant     Abnormal Results:   Labs Ordered and Resulted from Time of ED Arrival to Time of ED Departure   COMPREHENSIVE METABOLIC PANEL - Abnormal       Result Value    Sodium 134 (*)     Potassium 6.5 (*)     Carbon Dioxide (CO2) 21 (*)     Anion Gap 13      Urea Nitrogen 34.5 (*)     Creatinine 2.12 (*)     GFR Estimate 35 (*)     Calcium 9.7      Chloride 100      Glucose 107 (*)     Alkaline Phosphatase 65      AST 73 (*)     ALT 23      Protein Total 7.9      Albumin 5.0      Bilirubin Total 0.3     ISTAT GASES LACTATE VENOUS POCT - Abnormal    Lactic Acid POCT 1.3      Bicarbonate Venous POCT 23      O2 Sat, Venous POCT 35 (*)     pCO2 Venous POCT 47      pH Venous POCT 7.29 (*)     pO2 Venous POCT 24 (*)    INFLUENZA A/B, RSV AND SARS-COV2 PCR - Normal    Influenza A PCR Negative      Influenza B PCR Negative      RSV PCR Negative       SARS CoV2 PCR Negative     CK TOTAL - Normal         MAGNESIUM - Normal    Magnesium 1.8     CBC WITH PLATELETS AND DIFFERENTIAL    WBC Count 8.5      RBC Count 4.63      Hemoglobin 13.4      Hematocrit 41.5      MCV 90      MCH 28.9      MCHC 32.3      RDW 12.6      Platelet Count 301      % Neutrophils 70      % Lymphocytes 22      % Monocytes 7      % Eosinophils 0      % Basophils 1      % Immature Granulocytes 1      NRBCs per 100 WBC 0      Absolute Neutrophils 6.0      Absolute Lymphocytes 1.9      Absolute Monocytes 0.6      Absolute Eosinophils 0.0      Absolute Basophils 0.0      Absolute Immature Granulocytes 0.0      Absolute NRBCs 0.0     GLUCOSE MONITOR NURSING POCT   GLUCOSE MONITOR NURSING POCT   POTASSIUM        No orders to display       Treatments provided: IV dextrose, IV insulin, Calcium, Lasix   Family Comments: NA  OBS brochure/video discussed/provided to patient:  Yes  ED Medications:   Medications   glucose gel 15-30 g (has no administration in time range)     Or   dextrose 50 % injection 25-50 mL (has no administration in time range)     Or   glucagon injection 1 mg (has no administration in time range)   sodium chloride 0.9% BOLUS 1,000 mL (1,000 mLs Intravenous $New Bag 7/10/25 1534)   acetaminophen (TYLENOL) tablet 1,000 mg (1,000 mg Oral $Given 7/10/25 1529)   ondansetron (ZOFRAN) injection 4 mg (4 mg Intravenous $Given 7/10/25 1529)   dextrose 50 % injection 25 g (25 g Intravenous $Given 7/10/25 1658)   insulin regular 1 unit/mL injection 10 Units (10 Units Intravenous $Given 7/10/25 1655)   furosemide (LASIX) injection 40 mg (40 mg Intravenous $Given 7/10/25 1705)   albuterol (PROVENTIL) neb solution 10 mg (10 mg Nebulization $Given 7/10/25 1701)   calcium gluconate 1 g in 50 mL in sodium chloride intermittent infusion (1 g Intravenous $Given 7/10/25 1654)       Drips infusing:  No  For the majority of the shift this patient was Green.   Interventions performed were  NA.    Sepsis treatment initiated: No    Cares/treatment/interventions/medications to be completed following ED care: See admission orders    ED Nurse Name: Che Simmons RN  5:20 PM     RECEIVING UNIT ED HANDOFF REVIEW    Above ED Nurse Handoff Report was reviewed: Yes  Reviewed by: Celena Saleem RN on July 10, 2025 at 6:01 PM   SAMUEL Anthony called the ED to inform them the note was read: Yes

## 2025-07-10 NOTE — H&P
[facility] History and Physical    Dimitry Rick Sr. MRN# [id number]   Age: 60 year old YOB: 1965     Date of Admission:  7/10/2025    [facility]  Primary care provider: Deven Sandoval          Assessment and Plan:   Assessment:   Dimitry Rick Sr. is a 60 year old man who came to attention today in the [facility] due to malaise.  During the rooming process, the patient vomited.  He was found to be significantly hypotensive for which reason he was sent urgently to the emergency department at [facility].  In the ED, Mr. Rick is found to have acute kidney injury with significant hyperkalemia.    PMH includes NIDDM, HTN, HLD, CAD s/p MI.      On presentation to the ED, VS: Temp:  [97.4  F (36.3  C)-98.1  F (36.7  C)] 97.4  F (36.3  C)  Pulse:  [62-78] 75  Resp:  [14-20] 19  BP: ()/(54-91) 131/56  SpO2:  [97 %-100 %] 99 %  Exam: Mr. Rick is a robust appearing 60-year-old gentleman.  Examination is nonfocal.  Notably, his right thumb appears normal.  He indicates the area where he had a paronychia and indicates is not at all tender.  Labs: Creat 2.12, Na 134, K 6.5, HCO3 21. VBG pH 7.29/pCO2 47. WBC 8.5, Hgb 13.4, Plt 301. Covid, Influenza A/B, RSV negative.   EKG: NSR, peaked T waves.  Interventions: NS 1 liter followed by Furosemide 40 mg, Insulin Reg 10 U/D50% 25 g and Albuterol neb 10 mg.     DX:  HYUN with hyperkalemia.  Mixed with component of underlying mild albuminuria due to early diabetic nephropathy, dehydration (from vomiting and diarrhea), ongoing use of metformin and olmesartan and exposure to Bactrim.   Right thumb infection, s/p I & D on 7/6. Pt had empirically been started on Bactrim on that date and stopped it on 7/8.  Appears resolved at this time.  NIDDM. On metformin, Jardiance. Incipient   HTN managed on Olmesartan.      Plan:   Admit to inpatient.   Monitor K q 4 hours.   Cont with  NS bolus (a second liter now) not chased with lasix and plan to run 0.45 ns overnight at 100 ml/hr.  Avoid bactrim in the future.  Consider involving nephrology if the HYUN or hyperkalemia do not rapidly resolve.              Chief Complaint:     Malaise, vomiting and hypotension.     History is obtained from the patient, electronic health record, and emergency department physician     Dimitry Rick Sr. had initially been seen,.  On 7/6 due to concern for a right thumb paronychia.  That was drained and patient was discharged with Bactrim.  It does not appear that he had any labs completed that day and the fluid was not sent for culture either.  He evidently was feeling more ill with some diarrhea by Tuesday for which reason he discontinued the medication.  He continued to feel ill for which reason he presented back to his urgent care clinic..today    In the urgent care today, the patient vomited and shortly after that his blood pressure was checked and he was noted to be significantly hypotensive.  His dizziness and weakness from that moment also passed but he was directed to the emergency department in any case.    Here in the ED, patient was found to have an acute kidney injury and was treated for significant hyperkalemia.  He appears to both done very well.    Notably, the patient has not had fevers, sweats or chills.  He feels that the right thumb infection is completely resolved.  He has been taking his medications as usual but has not had much of an appetite.  He did not vomit before today and only vomited 1 time when he was at the urgent care.  He denies significant dizziness and weakness but has generally felt ill.    He does endorse diarrhea earlier in the week on Monday but that that did not persist.  He subsequently has developed a runny nose with a mild cough but denies high fevers or drenching sweats.      He is generally a very robust man and walks 3 times a week.  Chart review indicates that his  sugar control has been very good with hemoglobin A1c running in the 6-7 range.          Past Medical History:     Past Medical History:   Diagnosis Date    Essential hypertension, benign     added atenolol 3/06 (HCTZ se urinary freq)    Hyperlipidemia     on lipitor- AST elevated with WRIGHT per GI, okay to do statins with monitoring    Juvenile rheumatoid arthritis     Type 2 diabetes mellitus 2002    metformin 1000mg             Past Surgical History:    No past surgical history on file.          Social History:     Social History     Tobacco Use    Smoking status: Former     Current packs/day: 0.00     Types: Cigarettes     Quit date:      Years since quittin.5    Smokeless tobacco: Never   Substance Use Topics    Alcohol use: Yes     Alcohol/week: 14.0 standard drinks of alcohol     Types: 14 Standard drinks or equivalent per week     Comment: Couple of shots every night             Family History:     Family History   Problem Relation Age of Onset    Diabetes Mother         Type 1    Arthritis Mother         ?    Cerebrovascular Disease Father     Diabetes Type 2  Sister     Cancer Sister      Family history reviewed          Immunizations:     Immunization History   Administered Date(s) Administered    COVID-19 MONOVALENT 12+ (Pfizer) 2021, 2021, 01/10/2022    Pneumococcal 23 valent 10/10/2016    TD,PF 7+ (Tenivac) 2006    TDAP Vaccine (Adacel) 10/10/2016             Allergies:     Allergies   Allergen Reactions    Penicillins      joint aches             Medications:     Medications Prior to Admission   Medication Sig Dispense Refill Last Dose/Taking    ASPIRIN LOW DOSE 81 MG chewable tablet TAKE 1 TABLET (81 MG) BY MOUTH DAILY 30 tablet 0 2025 Evening    atorvastatin (LIPITOR) 40 MG tablet Take 1 tablet (40 mg) by mouth daily. 90 tablet 1 7/10/2025 Morning    empagliflozin (JARDIANCE) 25 MG TABS tablet Take 1 tablet (25 mg) by mouth daily. 90 tablet 1 7/10/2025 Morning     ezetimibe (ZETIA) 10 MG tablet Take 1 tablet (10 mg) by mouth daily. 90 tablet 3 7/10/2025 Morning    gabapentin (NEURONTIN) 300 MG capsule Take 1 capsule (300 mg) by mouth at bedtime. 90 capsule 3 7/9/2025 Bedtime    metFORMIN (GLUCOPHAGE XR) 500 MG 24 hr tablet TAKE 4 TABLETS(2000 MG) BY MOUTH DAILY WITH BREAKFAST 120 tablet 3 7/10/2025 Morning    metoprolol tartrate (LOPRESSOR) 25 MG tablet Take 1 tablet (25 mg) by mouth 2 times daily. 180 tablet 1 7/10/2025 Morning    olmesartan (BENICAR) 20 MG tablet Take 1 tablet (20 mg) by mouth daily. 90 tablet 1 7/9/2025 Evening             Review of Systems:     A comprehensive review of systems was performed and found to be negative except as described in this note           Physical Exam:   Vitals were reviewed  Temp: 97.4  F (36.3  C) Temp src: Oral BP: 121/77 Pulse: 101   Resp: 18 SpO2: 97 % O2 Device: None (Room air)    Constitutional: Awake, alert, cooperative, no apparent distress, and appears stated age.  Eyes: Lids and lashes normal, pupils equal, round and reactive to light, extra ocular muscles intact, sclera clear, conjunctiva normal.  ENT: Normocephalic, without obvious abnormality, atraumatic.  Neck: Supple, symmetrical, trachea midline, no adenopathy, thyroid symmetric, not enlarged and no tenderness, skin normal.  Hematologic / Lymphatic: No cervical lymphadenopathy and no supraclavicular lymphadenopathy.  Back: Symmetric, no curvature, spinous processes are non-tender on palpation, paraspinous muscles are non-tender on palpation, no costal vertebral tenderness.  Lungs: No increased work of breathing, good air exchange, clear to auscultation bilaterally, no crackles or wheezing.  Cardiovascular: Regular rate and rhythm, normal S1 and S2, no S3 or S4, and no murmur noted.  Abdomen: No scars, normal bowel sounds, soft, non-distended, non-tender, no masses palpated, no hepatosplenomegaly.  Musculoskeletal: No redness, warmth, or swelling of the joints.  The  right thumb looks normal, without evident tenderness or swelling. Tone is normal.  Neurologic: Awake, alert, oriented to name, place and time.  Cranial nerves II-XII are grossly intact.  Motor is 5 out of 5 bilaterally.    Neuropsychiatric: Normal affect, mood, orientation, memory and insight.  Skin: No rashes, erythema, pallor, petechia or purpura.          Data:     Recent Results (from the past 24 hours)   Glucose, whole blood   Result Value Ref Range    Glucose Whole Blood 146 (H) 60 - 99 mg/dL   Influenza A/B, RSV and SARS-CoV2 PCR (COVID-19) Nasopharyngeal    Specimen: Nasopharyngeal; Swab   Result Value Ref Range    Influenza A PCR Negative Negative    Influenza B PCR Negative Negative    RSV PCR Negative Negative    SARS CoV2 PCR Negative Negative    Narrative    Testing was performed using the Xpert Xpress CoV2/Flu/RSV Assay on the Cepheid GeneXpert Instrument. This test should be ordered for the detection of SARS-CoV2, influenza, and RSV viruses in individuals with signs and symptoms of respiratory tract infection. This test is for in vitro diagnostic use under the [place] FDA for laboratories certified under CLIA to perform high or moderate complexity testing. This test has been [place] FDA cleared. A negative result does not rule out the presence of PCR inhibitors in the specimen or target RNA in concentration below the limit of detection for the assay. If only one viral target is positive but coinfection with multiple targets is suspected, the sample should be re-tested with another FDA cleared, approved, or authorized test, if coninfection would change clinical management. This test was validated by the [facility]. These laboratories are certified under the Clinical Laboratory Improvement Amendments of 1988 (CLIA-88) as qualified to perfom high complexity laboratory testing.   CBC with platelets differential    Narrative    The following orders were created for panel order CBC with platelets  differential.  Procedure                               Abnormality         Status                     ---------                               -----------         ------                     CBC with platelets and ...[[id number]]                      Final result                 Please view results for these tests on the individual orders.   Comprehensive metabolic panel   Result Value Ref Range    Sodium 134 (L) 135 - 145 mmol/L    Potassium 6.5 (HH) 3.4 - 5.3 mmol/L    Carbon Dioxide (CO2) 21 (L) 22 - 29 mmol/L    Anion Gap 13 7 - 15 mmol/L    Urea Nitrogen 34.5 (H) 6.0 - 20.0 mg/dL    Creatinine 2.12 (H) 0.67 - 1.17 mg/dL    GFR Estimate 35 (L) >60 mL/min/1.73m2    Calcium 9.7 8.8 - 10.4 mg/dL    Chloride 100 98 - 107 mmol/L    Glucose 107 (H) 70 - 99 mg/dL    Alkaline Phosphatase 65 40 - 150 U/L    AST 73 (H) 0 - 45 U/L    ALT 23 0 - 70 U/L    Protein Total 7.9 6.4 - 8.3 g/dL    Albumin 5.0 3.5 - 5.2 g/dL    Bilirubin Total 0.3 <=1.2 mg/dL   CBC with platelets and differential   Result Value Ref Range    WBC Count 8.5 4.0 - 11.0 10e3/uL    RBC Count 4.63 4.40 - 5.90 10e6/uL    Hemoglobin 13.4 13.3 - 17.7 g/dL    Hematocrit 41.5 40.0 - 53.0 %    MCV 90 78 - 100 fL    MCH 28.9 26.5 - 33.0 pg    MCHC 32.3 31.5 - 36.5 g/dL    RDW 12.6 10.0 - 15.0 %    Platelet Count 301 150 - 450 10e3/uL    % Neutrophils 70 %    % Lymphocytes 22 %    % Monocytes 7 %    % Eosinophils 0 %    % Basophils 1 %    % Immature Granulocytes 1 %    NRBCs per 100 WBC 0 <1 /100    Absolute Neutrophils 6.0 1.6 - 8.3 10e3/uL    Absolute Lymphocytes 1.9 0.8 - 5.3 10e3/uL    Absolute Monocytes 0.6 0.0 - 1.3 10e3/uL    Absolute Eosinophils 0.0 0.0 - 0.7 10e3/uL    Absolute Basophils 0.0 0.0 - 0.2 10e3/uL    Absolute Immature Granulocytes 0.0 <=0.4 10e3/uL    Absolute NRBCs 0.0 10e3/uL   CK total - Evaluate for rhabdomyolysis   Result Value Ref Range     39 - 308 U/L   Magnesium   Result Value Ref Range    Magnesium 1.8 1.7 - 2.3 mg/dL   EKG 12  lead   Result Value Ref Range    Systolic Blood Pressure  mmHg    Diastolic Blood Pressure  mmHg    Ventricular Rate 69 BPM    Atrial Rate 69 BPM    IA Interval 164 ms    QRS Duration 96 ms     ms    QTc 398 ms    P Axis 60 degrees    R AXIS 67 degrees    T Axis 37 degrees    Interpretation ECG       Sinus rhythm with sinus arrhythmia  Normal ECG  When compared with ECG of 10-Jul-2020 18:35,  No significant change was found     Extra Tube ([place] Draw)    Narrative    The following orders were created for panel order Extra Tube ([place] Draw).  Procedure                               Abnormality         Status                     ---------                               -----------         ------                     Extra Blue Top Tube[[id number]]                             Final result               Extra Red Top Tube[[id number]]                              Final result               Extra Green Top (Lithiu...[[id number]]                      Final result               Extra Purple Top Tube[[id number]]                           Final result                 Please view results for these tests on the individual orders.   Extra Blue Top Tube   Result Value Ref Range    Hold Specimen JIC    Extra Red Top Tube   Result Value Ref Range    Hold Specimen JIC    Extra Green Top (Lithium Heparin) Tube   Result Value Ref Range    Hold Specimen JIC    Extra Purple Top Tube   Result Value Ref Range    Hold Specimen JIC    iStat Gases (lactate) venous, POCT   Result Value Ref Range    Lactic Acid POCT 1.3 0.7 - 2.0 mmol/L    Bicarbonate Venous POCT 23 21 - 28 mmol/L    O2 Sat, Venous POCT 35 (L) 70 - 75 %    pCO2 Venous POCT 47 40 - 50 mm Hg    pH Venous POCT 7.29 (L) 7.32 - 7.43    pO2 Venous POCT 24 (L) 25 - 47 mm Hg   Glucose by meter   Result Value Ref Range    GLUCOSE BY METER POCT 91 70 - 99 mg/dL   Glucose by meter   Result Value Ref Range    GLUCOSE BY METER POCT 151 (H) 70 - 99 mg/dL   Potassium   Result Value  Ref Range    Potassium 4.2 3.4 - 5.3 mmol/L   Glucose by meter   Result Value Ref Range    GLUCOSE BY METER POCT 109 (H) 70 - 99 mg/dL   Glucose by meter   Result Value Ref Range    GLUCOSE BY METER POCT 148 (H) 70 - 99 mg/dL   UA with Microscopic   Result Value Ref Range    Color Urine Straw Colorless, Straw, Light Yellow, Yellow    Appearance Urine Clear Clear    Glucose Urine 300 (A) Negative mg/dL    Bilirubin Urine Negative Negative    Ketones Urine Negative Negative mg/dL    Specific Gravity Urine 1.008 1.003 - 1.035    Blood Urine Negative Negative    pH Urine 5.0 5.0 - 7.0    Protein Albumin Urine Negative Negative mg/dL    Urobilinogen Urine Normal Normal mg/dL    Nitrite Urine Negative Negative    Leukocyte Esterase Urine Negative Negative    Mucus Urine Present (A) None Seen /LPF    RBC Urine 0 <=2 /HPF    WBC Urine 1 <=5 /HPF    Squamous Epithelials Urine <1 <=1 /HPF    Hyaline Casts Urine 1 <=2 /LPF      All cardiac studies reviewed by me.   All imaging studies reviewed by me.      Attestation:  I have reviewed today's vital signs, notes, medications, labs and imaging.     Mariano Alvarez MD

## 2025-07-11 VITALS
OXYGEN SATURATION: 98 % | SYSTOLIC BLOOD PRESSURE: 120 MMHG | WEIGHT: 228.2 LBS | RESPIRATION RATE: 18 BRPM | HEART RATE: 68 BPM | BODY MASS INDEX: 29.29 KG/M2 | DIASTOLIC BLOOD PRESSURE: 76 MMHG | TEMPERATURE: 98.5 F | HEIGHT: 74 IN

## 2025-07-11 PROBLEM — R11.2 NAUSEA AND VOMITING, UNSPECIFIED VOMITING TYPE: Status: ACTIVE | Noted: 2025-07-11

## 2025-07-11 LAB
ANION GAP SERPL CALCULATED.3IONS-SCNC: 12 MMOL/L (ref 7–15)
ATRIAL RATE - MUSE: 69 BPM
ATRIAL RATE - MUSE: 69 BPM
BUN SERPL-MCNC: 28.9 MG/DL (ref 6–20)
CALCIUM SERPL-MCNC: 9.8 MG/DL (ref 8.8–10.4)
CHLORIDE SERPL-SCNC: 104 MMOL/L (ref 98–107)
CREAT SERPL-MCNC: 1.35 MG/DL (ref 0.67–1.17)
DIASTOLIC BLOOD PRESSURE - MUSE: NORMAL MMHG
DIASTOLIC BLOOD PRESSURE - MUSE: NORMAL MMHG
EGFRCR SERPLBLD CKD-EPI 2021: 61 ML/MIN/1.73M2
ERYTHROCYTE [DISTWIDTH] IN BLOOD BY AUTOMATED COUNT: 12.5 % (ref 10–15)
GLUCOSE SERPL-MCNC: 102 MG/DL (ref 70–99)
HCO3 SERPL-SCNC: 22 MMOL/L (ref 22–29)
HCT VFR BLD AUTO: 40.1 % (ref 40–53)
HGB BLD-MCNC: 13.1 G/DL (ref 13.3–17.7)
INTERPRETATION ECG - MUSE: NORMAL
INTERPRETATION ECG - MUSE: NORMAL
MCH RBC QN AUTO: 29.1 PG (ref 26.5–33)
MCHC RBC AUTO-ENTMCNC: 32.7 G/DL (ref 31.5–36.5)
MCV RBC AUTO: 89 FL (ref 78–100)
P AXIS - MUSE: 56 DEGREES
P AXIS - MUSE: 60 DEGREES
PLATELET # BLD AUTO: 310 10E3/UL (ref 150–450)
POTASSIUM SERPL-SCNC: 4.8 MMOL/L (ref 3.4–5.3)
POTASSIUM SERPL-SCNC: 4.8 MMOL/L (ref 3.4–5.3)
POTASSIUM SERPL-SCNC: 4.9 MMOL/L (ref 3.4–5.3)
PR INTERVAL - MUSE: 160 MS
PR INTERVAL - MUSE: 164 MS
QRS DURATION - MUSE: 90 MS
QRS DURATION - MUSE: 96 MS
QT - MUSE: 372 MS
QT - MUSE: 374 MS
QTC - MUSE: 398 MS
QTC - MUSE: 400 MS
R AXIS - MUSE: 60 DEGREES
R AXIS - MUSE: 67 DEGREES
RBC # BLD AUTO: 4.5 10E6/UL (ref 4.4–5.9)
SODIUM SERPL-SCNC: 138 MMOL/L (ref 135–145)
SYSTOLIC BLOOD PRESSURE - MUSE: NORMAL MMHG
SYSTOLIC BLOOD PRESSURE - MUSE: NORMAL MMHG
T AXIS - MUSE: 37 DEGREES
T AXIS - MUSE: 38 DEGREES
VENTRICULAR RATE- MUSE: 69 BPM
VENTRICULAR RATE- MUSE: 69 BPM
WBC # BLD AUTO: 8.1 10E3/UL (ref 4–11)

## 2025-07-11 PROCEDURE — 82947 ASSAY GLUCOSE BLOOD QUANT: CPT | Performed by: INTERNAL MEDICINE

## 2025-07-11 PROCEDURE — 80048 BASIC METABOLIC PNL TOTAL CA: CPT | Performed by: INTERNAL MEDICINE

## 2025-07-11 PROCEDURE — 250N000013 HC RX MED GY IP 250 OP 250 PS 637: Performed by: INTERNAL MEDICINE

## 2025-07-11 PROCEDURE — 85018 HEMOGLOBIN: CPT | Performed by: INTERNAL MEDICINE

## 2025-07-11 PROCEDURE — 99239 HOSP IP/OBS DSCHRG MGMT >30: CPT | Performed by: INTERNAL MEDICINE

## 2025-07-11 PROCEDURE — 36415 COLL VENOUS BLD VENIPUNCTURE: CPT | Performed by: INTERNAL MEDICINE

## 2025-07-11 PROCEDURE — G0378 HOSPITAL OBSERVATION PER HR: HCPCS

## 2025-07-11 RX ADMIN — EZETIMIBE 10 MG: 10 TABLET ORAL at 07:56

## 2025-07-11 RX ADMIN — EMPAGLIFLOZIN 25 MG: 25 TABLET, FILM COATED ORAL at 07:56

## 2025-07-11 RX ADMIN — ATORVASTATIN CALCIUM 40 MG: 40 TABLET, FILM COATED ORAL at 07:56

## 2025-07-11 RX ADMIN — METOPROLOL TARTRATE 25 MG: 25 TABLET, FILM COATED ORAL at 07:56

## 2025-07-11 ASSESSMENT — ACTIVITIES OF DAILY LIVING (ADL)
ADLS_ACUITY_SCORE: 22

## 2025-07-11 NOTE — PROGRESS NOTES
[id number]  A&Ox4. VSS on room air, afebrile. Denies pain and nausea. Bolus completed, K recheck 4.7. Up independently.

## 2025-07-11 NOTE — PROVIDER NOTIFICATION
MD Notification    Notified Person: Cross cover MD    Notified Person Name: Veronika Perez    Notification Date/Time: 7/11/2025 0051    Mechanism of Provider Notification: [employer] messaging    Purpose of Notification: FYI- EKG results are in incase they haven't been seen/or interventions are needed. Just took over for him so unsure if previous nurse notified anyone.    Orders Received: MD aware, no response & no new orders at this time.    Comments:

## 2025-07-11 NOTE — PROGRESS NOTES
07/10/25 1836   Skin   Skin WDL X;integrity   Skin Integrity bruised (ecchymotic)   Bruised (ecchymotic) location Right;Toe  (big toe)   Device Skin Interventions Taken No adjustments needed     Admission/Transfer from: ER  2 RN skin assessment completed. Yes  Significant findings include: R big toe bruise otherwise unremarkable.  LDA added (if applicable)? NO  Requested WOC Nurse Consult from MD? NO    Celena Saleem RN

## 2025-07-11 NOTE — PLAN OF CARE
"Assumed care [id number]. A&Ox4. Up ad debbie when OOB. On RA. On a regular diet. Denies pain/discomfort. On telemetry- SR. Plan of care ongoing.     Goal Outcome Evaluation:      Plan of Care Reviewed With: patient    Overall Patient Progress: improvingOverall Patient Progress: improving    Outcome Evaluation: A&Ox4, on tele, denies pain, K slowly trending up again      Problem: Adult Inpatient Plan of Care  Goal: Plan of Care Review  Description: The Plan of Care Review/Shift note should be completed every shift.  The Outcome Evaluation is a brief statement about your assessment that the patient is improving, declining, or no change.  This information will be displayed automatically on your shift  note.  Outcome: Progressing  Flowsheets (Taken 7/11/2025 0226)  Outcome Evaluation: A&Ox4, on tele, denies pain, K slowly trending up again  Plan of Care Reviewed With: patient  Overall Patient Progress: improving  Goal: Patient-Specific Goal (Individualized)  Description: You can add care plan individualizations to a care plan. Examples of Individualization might be:  \"Parent requests to be called daily at 9am for status\", \"I have a hard time hearing out of my right ear\", or \"Do not touch me to wake me up as it startles  me\".  Outcome: Progressing  Goal: Absence of Hospital-Acquired Illness or Injury  Outcome: Progressing  Intervention: Identify and Manage Fall Risk  Recent Flowsheet Documentation  Taken 7/11/2025 0210 by Rosalva Phillips, RN  Safety Promotion/Fall Prevention:   lighting adjusted   nonskid shoes/slippers when out of bed   room near nurse's station   safety round/check completed  Taken 7/11/2025 0100 by Rosalva Phillips, RN  Safety Promotion/Fall Prevention: safety round/check completed  Intervention: Prevent Skin Injury  Recent Flowsheet Documentation  Taken 7/11/2025 0210 by Rosalva Phillips, RN  Body Position: position changed independently  Intervention: Prevent Infection  Recent Flowsheet " Documentation  Taken 7/11/2025 0210 by Rosalva Phillips RN  Infection Prevention:   equipment surfaces disinfected   hand hygiene promoted   personal protective equipment utilized   rest/sleep promoted   single patient room provided  Taken 7/11/2025 0100 by Rosalva Phillips RN  Infection Prevention:   equipment surfaces disinfected   hand hygiene promoted   personal protective equipment utilized   rest/sleep promoted   single patient room provided  Goal: Optimal Comfort and Wellbeing  Outcome: Progressing  Goal: Readiness for Transition of Care  Outcome: Progressing     Problem: Pain Acute  Goal: Optimal Pain Control and Function  Outcome: Progressing  Intervention: Prevent or Manage Pain  Recent Flowsheet Documentation  Taken 7/11/2025 0210 by Rosalva Phillips RN  Medication Review/Management: medications reviewed     Problem: Electrolyte Imbalance  Goal: Electrolyte Balance  Outcome: Progressing     Problem: Acute Kidney Injury/Impairment  Goal: Fluid and Electrolyte Balance  Outcome: Progressing  Goal: Improved Oral Intake  Outcome: Progressing  Goal: Effective Renal Function  Outcome: Progressing  Intervention: Monitor and Support Renal Function  Recent Flowsheet Documentation  Taken 7/11/2025 0210 by Rosalva Phillips RN  Medication Review/Management: medications reviewed     Problem: Comorbidity Management  Goal: Blood Glucose Levels Within Targeted Range  Outcome: Progressing  Intervention: Monitor and Manage Glycemia  Recent Flowsheet Documentation  Taken 7/11/2025 0210 by Rosalva Phillips RN  Medication Review/Management: medications reviewed  Goal: Blood Pressure in Desired Range  Outcome: Progressing  Intervention: Maintain Blood Pressure Management  Recent Flowsheet Documentation  Taken 7/11/2025 0210 by Rosalva Phillips RN  Medication Review/Management: medications reviewed

## 2025-07-11 NOTE — PROGRESS NOTES
Discharge Note    Patient discharged to home via taxi   IV: Discontinued  Prescriptions N/A.   Belongings reviewed and sent with patient.   Home medications returned to patient: NA  Equipment sent with: patient, N/A.   patient verbalizes understanding of discharge instructions. AVS given to patient.

## 2025-07-11 NOTE — CONSULTS
"CLINICAL NUTRITION SERVICES - ASSESSMENT NOTE    Registered Dietitian Interventions:  - Continue diet as ordered by provider.       REASON FOR ASSESSMENT  Malnutrition screening tool (MST).     PMH: DM, HTN, CAD.    Admit 2/2: HYUN, hyperkalemia, R thumb infection, hypotensions.    INFORMATION OBTAINED  Malnutrition screening tool (MST).    NUTRITION HISTORY  - Diet at home: Regular w/ meals BID (breakfast and dinner).  - Barriers to PO intakes: States he's been having low appetite for a period of days PTA d/t not having consistent BMs (states he usually has 2 BMs daily but hasn't had a BM for days).  Sometimes eating less at breakfast as a result.  - Use of oral supplements: None.  - Allergies: NKFA.    CURRENT NUTRITION ORDERS  Diet: Regular  Snacks/Supplements: None    CURRENT INTAKE/TOLERANCE  - Limited timeframe of admission.    LABS: Reviewed  - Cr trending noted  Lab Results   Component Value Date    A1C 6.1 06/23/2025    A1C 6.3 05/13/2025    A1C 6.4 02/11/2025    A1C 6.3 10/10/2016    A1C 6.3 08/31/2015    A1C 6.9 09/08/2014    A1C 6.4 12/30/2013    A1C 6.7 09/09/2013     MEDICATIONS: Reviewed    SYSTEM AND PHYSICAL FINDINGS:  - No recorded BM.   - No documentation of PI.    ANTHROPOMETRICS  Height: 188 cm (6' 2\")  Admission Weight: 103.5 kg (228 lb 3.2 oz) (07/10/25 1836)   Most Recent Weight: 103.5 kg (228 lb 3.2 oz) (07/10/25 1836)  BMI: Body mass index is 29.3 kg/m .   Weight History:   Wt Readings from Last 10 Encounters:   07/10/25 103.5 kg (228 lb 3.2 oz)   07/10/25 103 kg (227 lb)   07/06/25 103 kg (227 lb)   05/15/25 106.6 kg (235 lb)   05/13/25 106.6 kg (235 lb)   03/18/25 108.8 kg (239 lb 12.8 oz)   02/11/25 110.2 kg (243 lb)   02/05/25 112.2 kg (247 lb 6.4 oz)   05/08/24 108.2 kg (238 lb 8.6 oz)   03/29/17 109.8 kg (242 lb)     - No edema documented.  - Ezequiel describes ~10# wt loss over the past months.  Describes it as unintentional as he hasn't had a change in eating habits, but also tells me he " "has been on Jardiance for ~6 months and was told to expect wt loss at about that timeframe.     ASSESSED NUTRITION NEEDS  Dosing Weight: 104 kg, based on standing scale weight  Estimated Energy Needs: >/=2080 kcals/day (>/=20 kcals/kg)  Justification: Minimum maintenance  Estimated Protein Needs: >/=104 grams protein/day (>/=1 grams of pro/kg)  Justification: Preservation of lean body mass  Estimated Fluid Needs: 1 mL/kcal or per provider    MALNUTRITION  % Intake: Decreased intake does not meet criteria  % Weight Loss: Weight loss does not meet criteria   Subcutaneous Fat Loss: None observed  Muscle Loss: None observed  Fluid Accumulation/Edema: None noted  Malnutrition Diagnosis: Patient does not meet two of the established criteria necessary for diagnosing malnutrition but is at risk for malnutrition  Malnutrition Present on Admission: No    NUTRITION DIAGNOSIS  Predicted inadequate nutrient intake (energy/protein) related to potential for decline in PO intakes pending appetite and LOS.    INTERVENTIONS  Collaboration by nutrition professional with other providers: Discussed POC w/ team during rounds.      GOALS  Patient to consume % of nutritionally adequate meal trays TID, or the equivalent with supplements/snacks.     MONITORING/EVALUATION  Progress toward goals will be monitored and evaluated per policy.      Sabrina Rutherford RDN, , LD  Clinical Dietitian  [facility] Message Group: \"Dietitian [[facility]]\"  Office: [phone]  Pagers:  3rd floor/ICU: [phone]  All other floors: [phone]  Weekend/holiday: [phone]    "

## 2025-07-11 NOTE — PLAN OF CARE
1830 pt arrived from ER A/O denies pain.  Skin wnl x R big toe bruise.  Denies pain.  IV bolus of NS started.  UA sent. Indep in room.  VSS.    Celena Saleem RN

## 2025-07-11 NOTE — DISCHARGE SUMMARY
"[facility]  Hospitalist Discharge Summary      Date of Admission:  7/10/2025  Date of Discharge:  7/11/2025  Discharging Provider: Adolfo Cowart MD, MD  Discharge Service: Hospitalist Service    Discharge Diagnoses   Acute kidney injury improving and resolving  Hyperkalemia improving resolving  Non-insulin-requiring diabetes mellitus  Recent soft tissue infection with paronychia and was on Bactrim  Benign essential hypertension  History of CAD  History of dyslipidemia    Clinically Significant Risk Factors     # Overweight: Estimated body mass index is 29.3 kg/m  as calculated from the following:    Height as of this encounter: 1.88 m (6' 2\").    Weight as of this encounter: 103.5 kg (228 lb 3.2 oz).       Follow-ups Needed After Discharge   Follow-up Appointments       Hospital Follow-up with Existing Primary Care Provider (PCP)      Patient's several medications were put on hold until he gets to be seen upon follow-up and if repeat metabolic panel showing stable kidney function    Schedule Primary Care visit within: 7 Days   Recommended labs and Imaging (to be ordered by Primary Care Provider): Repeat BMP               Unresulted Labs Ordered in the Past 30 Days of this Admission       No orders found for last 31 day(s).        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  Patient right thumb infection appears to be improving resolved however he presented in the hospital due to " episodes of bodyaches, decreasing in appetite, nausea and vomiting.  He was eventually found with acute kidney injury complicated with hyperkalemia.  Fortunately he did not exhibit any malignant arrhythmias.  Demonstrated stable hemodynamics.  He was treated with IV fluid support.  Held his metformin, olmesartan, aspirin and no further continuation of his Bactrim.  He responded well overnight with resolution of the hyperkalemia, continues to show improvement with his recent HYUN with most recent creatinine at 1.35.  He is tolerating oral diet with no nausea or vomiting.  No diarrhea.  He is showing stable hemodynamics.  He is requesting and hoping for hospital discharge.  Discussed with him importance of close follow-up care next week with repeat metabolic panel as I elected to continue to hold his olmesartan, metformin, aspirin in the setting of recent HYUN and hyperkalemia.  This can be resumed and further decision-making can be pursued if he has follow-up care showing resolution of earlier HYUN and no further hyperkalemia.    Consultations This Hospital Stay   None    Code Status   Full Code    Time Spent on this Encounter   I, Adolfo Cowart MD, MD, personally saw the patient today and spent greater than 30 minutes discharging this patient.       Adolfo Cowart MD, MD  [place] [facility] [address] [facility]  [address] [place] [address]  Phone: [phone]  Fax: [phone]  ______________________________________________________________________    Physical Exam   Vital Signs: Temp: 98.5  F (36.9  C) Temp src: Oral BP: 120/76 Pulse: 68   Resp: 18 SpO2: 98 % O2 Device: None (Room air)    Weight: 228 lbs 3.2 oz  HEENT; Atraumatic, normocephalic, pinkish conjuctiva, pupils bilateral reactive   Skin: warm and moist, no rashes  Lymphatics: no cervical or axillary lymphandenopathy  Lungs: equal chest expansion, clear to auscultation, no wheezes, no stridor, no crackles,   Heart: normal  rate, normal rhythm, no rubs or gallops.   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  Patient right thumb infection appears to be improving resolved however he presented in the hospital due to episodes of bodyaches, decreasing in appetite, nausea and vomiting.  He was eventually found with acute kidney injury complicated with hyperkalemia.  Fortunately he did not exhibit any malignant arrhythmias.  Demonstrated stable hemodynamics.  He was treated with IV fluid support.  Held his metformin, olmesartan, aspirin and no further continuation of his Bactrim.  He responded well overnight with resolution of the hyperkalemia, continues to show improvement with his recent HYUN with most recent creatinine at 1.35.  He is tolerating oral diet with no nausea or vomiting.  No diarrhea.  He is showing stable hemodynamics.  He is requesting and hoping for hospital discharge.  Discussed with him importance of close follow-up care next week with repeat metabolic panel as I elected to continue to hold his olmesartan, metformin, aspirin in the setting of recent HYUN and hyperkalemia.  This can be resumed and further decision-making can be pursued if he has follow-up care showing  resolution of earlier HYUN and no further hyperkalemia.     Activity    Your activity upon discharge: activity as tolerated     Full Code     Diet    Follow this diet upon discharge: Current Diet:Orders Placed This Encounter      Combination Diet Regular Diet Adult     Hospital Follow-up with Existing Primary Care Provider (PCP)    Patient's several medications were put on hold until he gets to be seen upon follow-up and if repeat metabolic panel showing stable kidney function       Significant Results and Procedures   Most Recent 3 CBC's:  Recent Labs   Lab Test 07/11/25  0607 07/10/25  1545 07/10/20  1454   WBC 8.1 8.5 7.1   HGB 13.1* 13.4 13.2*   MCV 89 90 89    301 251     Most Recent 3 BMP's:  Recent Labs   Lab Test 07/11/25  0607 07/11/25  0157 07/10/25  2238 07/10/25  2203 07/10/25  2132 07/10/25  1658 07/10/25  1545 05/13/25  0944     --   --   --   --   --  134* 143   POTASSIUM 4.8  4.8 4.9  --  4.7  --    < > 6.5* 4.1   CHLORIDE 104  --   --   --   --   --  100 108*   CO2 22  --   --   --   --   --  21* 22   BUN 28.9*  --   --   --   --   --  34.5* 21.7*   CR 1.35*  --   --   --   --   --  2.12* 1.08   ANIONGAP 12  --   --   --   --   --  13 13   AUGIE 9.8  --   --   --   --   --  9.7 9.7   *  --  118*  --  136*   < > 107* 121*    < > = values in this interval not displayed.     Most Recent 2 LFT's:  Recent Labs   Lab Test 07/10/25  1545 07/18/18  0704   AST 73* 49*   ALT 23 22   ALKPHOS 65 60   BILITOTAL 0.3 0.5     Most Recent 3 INR's:No lab results found.  Most Recent Cholesterol Panel:  Recent Labs   Lab Test 05/13/25  0944   CHOL 118   LDL 52   HDL 52   TRIG 71     7-Day Micro Results       Collected Updated Procedure Result Status      07/10/2025 1535 07/10/2025 1635 Influenza A/B, RSV and SARS-CoV2 PCR (COVID-19) Nasopharyngeal [[id number]]    Swab from Nasopharyngeal    Final result Component Value   Influenza A PCR Negative   Influenza B PCR Negative   RSV PCR Negative   SARS  CoV2 PCR Negative   NEGATIVE: SARS-CoV-2 (COVID-19) RNA not detected, presumed negative.                  Most Recent TSH and T4:  Recent Labs   Lab Test 18   TSH 2.41     Most Recent Hemoglobin A1c:  Recent Labs   Lab Test 25  0754   A1C 6.1*     Most Recent 6 glucoses:  Recent Labs   Lab Test 25  0607 07/10/25  2238 07/10/25  2132 07/10/25  2027 07/10/25  1842 07/10/25  1754   * 118* 136* 130* 148* 109*     Most Recent Urinalysis:  Recent Labs   Lab Test 07/10/25  1922 05/08/24  2315 18  0650   COLOR Straw   < > Yellow   APPEARANCE Clear   < > Clear   URINEGLC 300*   < > 50 mg/dL*   URINEBILI Negative   < > Negative   URINEKETONE Negative   < > Negative   SG 1.008   < > 1.005   UBLD Negative   < > Negative   URINEPH 5.0   < > 5.0   PROTEIN Negative   < > Negative   UROBILINOGEN  --   --  <2.0 E.U./dL   NITRITE Negative   < > Negative   LEUKEST Negative   < > Negative   RBCU 0   < >  --    WBCU 1   < >  --     < > = values in this interval not displayed.   ,   Results for orders placed or performed during the hospital encounter of 25   Echocardiogram Complete     Value    LVEF  55-60%    Narrative    [id number]  [id number]  [id number]  [id number]^LUKE^TAMMY     [facility]  Echocardiography Laboratory  [address] [place] [address]     Name: DEEPIKA SEGURA  MRN: [id number]  : 1965  Study Date: 2025 06:15 AM  Age: 59 yrs  Gender: Male  Patient Location: [facility]  Reason For Study: Coronary artery disease involving native coronary artery of  nay  Ordering Physician: LUKE GUTIERREZ  Referring Physician: LUKE GUTIERREZ  Performed By: Isabela Hernandez     BSA: 2.4 m2  Height: 74 in  Weight: 243 lb  HR: 57  ______________________________________________________________________________  Procedure  Echocardiogram with two-dimensional, color and spectral Doppler. Technically  difficult  study.  ______________________________________________________________________________  Interpretation Summary     Technically difficult study.     The visual ejection fraction is 55-60%.  Proximal septal thickening is noted measuring 1.4-1.5 cms. Mild to moderate  asymmetric left ventricular without LVOT obstruction  The right ventricle is normal in size and function.  The left atrium is mildly dilated.  No hemodynamically significant valvular disease.  There is no pericardial effusion.  ______________________________________________________________________________  Left Ventricle  The left ventricle is normal in size. Proximal septal thickening is noted.  Left ventricular hypertrophy: asymmetric with no LVOT obstruction. The visual  ejection fraction is 55-60%. Diastolic Doppler findings (E/E' ratio and/or  other parameters) suggest left ventricular filling pressures are  indeterminate. No regional wall motion abnormalities noted.     Right Ventricle  The right ventricle is normal in size and function.     Atria  The left atrium is mildly dilated. Right atrial size is normal.     Mitral Valve  The mitral valve is normal in structure and function.     Tricuspid Valve  The tricuspid valve is not well visualized, but is grossly normal. Right  ventricular systolic pressure could not be approximated due to inadequate  tricuspid regurgitation.     Aortic Valve  No aortic regurgitation is present. No hemodynamically significant valvular  aortic stenosis.     Pulmonic Valve  The pulmonic valve is not well visualized.     Vessels  The aortic root is normal size. The inferior vena cava was normal in size with  preserved respiratory variability.     Pericardium  There is no pericardial effusion.     ______________________________________________________________________________  MMode/2D Measurements & Calculations  IVSd: 1.2 cm  LVIDd: 5.4 cm  LVIDs: 3.3 cm  LVPWd: 1.1 cm  FS: 38.5 %     LV mass(C)d: 246.0 grams  LV  mass(C)dI: 104.2 grams/m2  Ao root diam: 3.5 cm  asc Aorta Diam: 3.7 cm  LVOT diam: 2.3 cm  LVOT area: 4.3 cm2  Ao root diam index Ht(cm/m): 1.9  Ao root diam index BSA (cm/m2): 1.5  Asc Ao diam index BSA (cm/m2): 1.6  Asc Ao diam index Ht(cm/m): 2.0  LA Volume Index (BP): 38.0 ml/m2  RWT: 0.40     Doppler Measurements & Calculations  MV E max daniela: 75.6 cm/sec  MV A max daniela: 63.3 cm/sec  MV E/A: 1.2  MV dec time: 0.18 sec  E/E' av.2  Lateral E/e': 8.8  Medial E/e': 13.6     ______________________________________________________________________________                     FEDE     Report approved by: Payal Davis MD on 2025 09:53 AM             Discharge Medications      Review of your medicines        PAUSE taking these medications        Dose / Directions   Aspirin Low Dose 81 MG chewable tablet  Wait to take this until your doctor or other care provider tells you to start again.  Used for: Controlled type 2 diabetes mellitus without complication, without long-term current use of insulin (H)  Generic drug: aspirin      TAKE 1 TABLET (81 MG) BY MOUTH DAILY  Quantity: 30 tablet  Refills: 0     metFORMIN 500 MG 24 hr tablet  Wait to take this until your doctor or other care provider tells you to start again.  Commonly known as: GLUCOPHAGE XR  Used for: Controlled type 2 diabetes mellitus without complication, without long-term current use of insulin (H)      Dose: 2,000 mg  TAKE 4 TABLETS(2000 MG) BY MOUTH DAILY WITH BREAKFAST  Quantity: 120 tablet  Refills: 3     olmesartan 20 MG tablet  Wait to take this until your doctor or other care provider tells you to start again.  Commonly known as: BENICAR  Used for: Essential hypertension with goal blood pressure less than 140/90      Dose: 20 mg  Take 1 tablet (20 mg) by mouth daily.  Quantity: 90 tablet  Refills: 1            CONTINUE these medicines which have NOT CHANGED        Dose / Directions   atorvastatin 40 MG tablet  Commonly known as: LIPITOR  Used  for: Hyperlipidemia LDL goal <100      Dose: 40 mg  Take 1 tablet (40 mg) by mouth daily.  Quantity: 90 tablet  Refills: 1     empagliflozin 25 MG Tabs tablet  Commonly known as: JARDIANCE  Used for: Controlled type 2 diabetes mellitus without complication, without long-term current use of insulin (H)      Dose: 25 mg  Take 1 tablet (25 mg) by mouth daily.  Quantity: 90 tablet  Refills: 1     ezetimibe 10 MG tablet  Commonly known as: ZETIA  Used for: Hyperlipidemia LDL goal <100      Dose: 10 mg  Take 1 tablet (10 mg) by mouth daily.  Quantity: 90 tablet  Refills: 3     gabapentin 300 MG capsule  Commonly known as: NEURONTIN  Used for: Restless legs syndrome (RLS)      Dose: 300 mg  Take 1 capsule (300 mg) by mouth at bedtime.  Quantity: 90 capsule  Refills: 3     metoprolol tartrate 25 MG tablet  Commonly known as: LOPRESSOR  Used for: Essential hypertension with goal blood pressure less than 140/90      Dose: 25 mg  Take 1 tablet (25 mg) by mouth 2 times daily.  Quantity: 180 tablet  Refills: 1            Allergies   Allergies   Allergen Reactions    Penicillins      joint aches

## 2025-07-14 ENCOUNTER — PATIENT OUTREACH (OUTPATIENT)
Dept: CARE COORDINATION | Facility: CLINIC | Age: 60
End: 2025-07-14
Payer: COMMERCIAL

## 2025-07-14 ENCOUNTER — PATIENT OUTREACH (OUTPATIENT)
Dept: FAMILY MEDICINE | Facility: CLINIC | Age: 60
End: 2025-07-14
Payer: COMMERCIAL

## 2025-07-14 NOTE — TELEPHONE ENCOUNTER
Transitions of Care Outreach  Chief Complaint   Patient presents with    Hospital F/U       Most Recent Admission Date: 7/10/2025   Most Recent Admission Diagnosis: Hyperkalemia - E87.5  HYUN (acute kidney injury) - N17.9  Nausea and vomiting, unspecified vomiting type - R11.2     Most Recent Discharge Date: 7/11/2025   Most Recent Discharge Diagnosis: Hyperkalemia - E87.5  HYUN (acute kidney injury) - N17.9  Nausea and vomiting, unspecified vomiting type - R11.2     Transitions of Care Assessment    Discharge Assessment  How are you doing now that you are home?: a lot better  How are your symptoms? (Red Flag symptoms escalate to triage hotline per guidelines): Improved  Do you know how to contact your clinic care team if you have future questions or changes to your health status? : Yes  Does the patient have their discharge instructions? : Yes  Does the patient have questions regarding their discharge instructions? : No  Were you started on any new medications or were there changes to any of your previous medications? : Yes  Does the patient have all of their medications?: Yes  Do you have questions regarding any of your medications? : No  Do you have all of your needed medical supplies or equipment (DME)?  (i.e. oxygen tank, CPAP, cane, etc.): Yes    Follow up Plan     Discharge Follow-Up  Discharge follow up appointment scheduled in alignment with recommended follow up timeframe or Transitions of Risk Category? (Low = within 30 days; Moderate= within 14 days; High= within 7 days): Yes  Discharge Follow Up Appointment Date: 07/17/25  Discharge Follow Up Appointment Scheduled with?: Primary Care Provider    Future Appointments   Date Time Provider Department Center   7/17/2025 10:00 AM Deven Sandoval MD [facility]   8/19/2025  9:30 AM Deven Sandoval MD [facility]       Outpatient Plan as outlined on AVS reviewed with patient.    For any urgent concerns, please contact our 24 hour nurse triage line:  [phone] ([phone])       Bianca Polanco RN

## 2025-07-14 NOTE — TELEPHONE ENCOUNTER
Please do TCM outreach     Hospital Follow Up    Diagnosis - hyperkalemia, HYUN (acute kidney injury), nausea and vomiting    Medications - pause taking ASA, metformin and olmesartan.      Follow Up instructions -   Hospital Follow-up with Existing Primary Care Provider (PCP)        Patient's several medications were put on hold until he gets to be seen upon follow-up and if repeat metabolic panel showing stable kidney function    Schedule Primary Care visit within: 7 Days   Recommended labs and Imaging (to be ordered by Primary Care Provider): Repeat BMP      Pt has follow up appt scheduled.    Appointments in Next Year      Jul 17, 2025 10:00 AM  (Arrive by 9:45 AM)  ED/Hospital Follow Up with Deven Sandoval MD  [facility] [place] ([facility] ) [phone]     Aug 19, 2025 9:30 AM  (Arrive by 9:10 AM)  Provider Visit with Deven Sandoval MD  [facility] [place] ([facility] ) [phone]

## 2025-07-18 DIAGNOSIS — E11.9 CONTROLLED TYPE 2 DIABETES MELLITUS WITHOUT COMPLICATION, WITHOUT LONG-TERM CURRENT USE OF INSULIN (H): ICD-10-CM

## 2025-07-21 RX ORDER — METFORMIN HYDROCHLORIDE 500 MG/1
2000 TABLET, EXTENDED RELEASE ORAL
Qty: 120 TABLET | Refills: 3 | Status: SHIPPED | OUTPATIENT
Start: 2025-07-21

## 2025-07-26 ENCOUNTER — HEALTH MAINTENANCE LETTER (OUTPATIENT)
Age: 60
End: 2025-07-26

## 2025-07-29 ENCOUNTER — TELEPHONE (OUTPATIENT)
Dept: FAMILY MEDICINE | Facility: CLINIC | Age: 60
End: 2025-07-29
Payer: COMMERCIAL

## 2025-07-29 NOTE — TELEPHONE ENCOUNTER
Patient calls back stating that he read his result message regarding his hemoglobin.     States that he has not noticed any abnormal bleeding but he did discharge from the hospital about 5 days prior to his blood draw. He states they were taking a lot of blood for labs. Patient states they were checking his blood levels every 2-3 hours for 2 days.

## 2025-08-10 DIAGNOSIS — I10 ESSENTIAL HYPERTENSION WITH GOAL BLOOD PRESSURE LESS THAN 140/90: ICD-10-CM

## 2025-08-10 DIAGNOSIS — E78.5 HYPERLIPIDEMIA LDL GOAL <100: ICD-10-CM

## 2025-08-11 RX ORDER — METOPROLOL TARTRATE 25 MG/1
25 TABLET, FILM COATED ORAL 2 TIMES DAILY
Qty: 180 TABLET | Refills: 2 | Status: SHIPPED | OUTPATIENT
Start: 2025-08-11

## 2025-08-11 RX ORDER — ATORVASTATIN CALCIUM 40 MG/1
40 TABLET, FILM COATED ORAL DAILY
Qty: 90 TABLET | Refills: 2 | Status: SHIPPED | OUTPATIENT
Start: 2025-08-11

## 2025-08-17 DIAGNOSIS — E11.9 CONTROLLED TYPE 2 DIABETES MELLITUS WITHOUT COMPLICATION, WITHOUT LONG-TERM CURRENT USE OF INSULIN (H): ICD-10-CM

## 2025-08-18 RX ORDER — EMPAGLIFLOZIN 25 MG/1
25 TABLET, FILM COATED ORAL DAILY
Qty: 90 TABLET | Refills: 1 | Status: SHIPPED | OUTPATIENT
Start: 2025-08-18 | End: 2025-08-19

## 2025-08-19 ENCOUNTER — OFFICE VISIT (OUTPATIENT)
Dept: FAMILY MEDICINE | Facility: CLINIC | Age: 60
End: 2025-08-19
Attending: FAMILY MEDICINE
Payer: COMMERCIAL

## 2025-08-19 VITALS
DIASTOLIC BLOOD PRESSURE: 79 MMHG | HEART RATE: 62 BPM | OXYGEN SATURATION: 99 % | RESPIRATION RATE: 16 BRPM | BODY MASS INDEX: 29.52 KG/M2 | WEIGHT: 230 LBS | SYSTOLIC BLOOD PRESSURE: 119 MMHG | TEMPERATURE: 97.4 F | HEIGHT: 74 IN

## 2025-08-19 DIAGNOSIS — E11.9 CONTROLLED TYPE 2 DIABETES MELLITUS WITHOUT COMPLICATION, WITHOUT LONG-TERM CURRENT USE OF INSULIN (H): Primary | ICD-10-CM

## 2025-08-19 DIAGNOSIS — I10 ESSENTIAL HYPERTENSION WITH GOAL BLOOD PRESSURE LESS THAN 140/90: ICD-10-CM

## 2025-08-19 DIAGNOSIS — M75.42 IMPINGEMENT SYNDROME OF SHOULDER REGION, LEFT: ICD-10-CM

## 2025-08-19 LAB
ERYTHROCYTE [DISTWIDTH] IN BLOOD BY AUTOMATED COUNT: 13.2 % (ref 10–15)
EST. AVERAGE GLUCOSE BLD GHB EST-MCNC: 123 MG/DL
HBA1C MFR BLD: 5.9 % (ref 0–5.6)
HCT VFR BLD AUTO: 42.1 % (ref 40–53)
HGB BLD-MCNC: 13.7 G/DL (ref 13.3–17.7)
MCH RBC QN AUTO: 29.1 PG (ref 26.5–33)
MCHC RBC AUTO-ENTMCNC: 32.5 G/DL (ref 31.5–36.5)
MCV RBC AUTO: 89.6 FL (ref 78–100)
PLATELET # BLD AUTO: 264 10E3/UL (ref 150–450)
RBC # BLD AUTO: 4.7 10E6/UL (ref 4.4–5.9)
WBC # BLD AUTO: 5.68 10E3/UL (ref 4–11)

## 2025-08-19 PROCEDURE — 1126F AMNT PAIN NOTED NONE PRSNT: CPT | Performed by: FAMILY MEDICINE

## 2025-08-19 PROCEDURE — 3078F DIAST BP <80 MM HG: CPT | Performed by: FAMILY MEDICINE

## 2025-08-19 PROCEDURE — 20610 DRAIN/INJ JOINT/BURSA W/O US: CPT | Mod: LT | Performed by: FAMILY MEDICINE

## 2025-08-19 PROCEDURE — 99214 OFFICE O/P EST MOD 30 MIN: CPT | Mod: 25 | Performed by: FAMILY MEDICINE

## 2025-08-19 PROCEDURE — 3044F HG A1C LEVEL LT 7.0%: CPT | Performed by: FAMILY MEDICINE

## 2025-08-19 PROCEDURE — 36415 COLL VENOUS BLD VENIPUNCTURE: CPT | Performed by: FAMILY MEDICINE

## 2025-08-19 PROCEDURE — 83036 HEMOGLOBIN GLYCOSYLATED A1C: CPT | Performed by: FAMILY MEDICINE

## 2025-08-19 PROCEDURE — 3074F SYST BP LT 130 MM HG: CPT | Performed by: FAMILY MEDICINE

## 2025-08-19 PROCEDURE — 85027 COMPLETE CBC AUTOMATED: CPT | Performed by: FAMILY MEDICINE

## 2025-08-19 RX ORDER — TRIAMCINOLONE ACETONIDE 40 MG/ML
40 INJECTION, SUSPENSION INTRA-ARTICULAR; INTRAMUSCULAR ONCE
Status: COMPLETED | OUTPATIENT
Start: 2025-08-19 | End: 2025-08-19

## 2025-08-19 RX ORDER — OLMESARTAN MEDOXOMIL 20 MG/1
20 TABLET ORAL DAILY
Qty: 90 TABLET | Refills: 1 | Status: SHIPPED | OUTPATIENT
Start: 2025-08-19

## 2025-08-19 RX ORDER — METFORMIN HYDROCHLORIDE 500 MG/1
2000 TABLET, EXTENDED RELEASE ORAL
Qty: 360 TABLET | Refills: 1 | Status: SHIPPED | OUTPATIENT
Start: 2025-08-19

## 2025-08-19 RX ADMIN — TRIAMCINOLONE ACETONIDE 40 MG: 40 INJECTION, SUSPENSION INTRA-ARTICULAR; INTRAMUSCULAR at 09:55

## 2025-08-19 ASSESSMENT — PAIN SCALES - GENERAL: PAINLEVEL_OUTOF10: NO PAIN (0)

## 2025-08-28 ENCOUNTER — TELEPHONE (OUTPATIENT)
Dept: FAMILY MEDICINE | Facility: CLINIC | Age: 60
End: 2025-08-28
Payer: COMMERCIAL

## 2025-08-28 DIAGNOSIS — M75.42 IMPINGEMENT SYNDROME OF SHOULDER REGION, LEFT: Primary | ICD-10-CM
